# Patient Record
Sex: FEMALE | Race: BLACK OR AFRICAN AMERICAN | Employment: PART TIME | URBAN - METROPOLITAN AREA
[De-identification: names, ages, dates, MRNs, and addresses within clinical notes are randomized per-mention and may not be internally consistent; named-entity substitution may affect disease eponyms.]

---

## 2019-08-02 ENCOUNTER — HOSPITAL ENCOUNTER (EMERGENCY)
Age: 24
Discharge: HOME | End: 2019-08-03
Attending: EMERGENCY MEDICINE
Payer: SELF-PAY

## 2019-08-02 VITALS
OXYGEN SATURATION: 99 % | RESPIRATION RATE: 16 BRPM | BODY MASS INDEX: 48.82 KG/M2 | TEMPERATURE: 98.8 F | DIASTOLIC BLOOD PRESSURE: 72 MMHG | HEART RATE: 79 BPM | WEIGHT: 293 LBS | SYSTOLIC BLOOD PRESSURE: 116 MMHG | HEIGHT: 65 IN

## 2019-08-02 DIAGNOSIS — B96.89 BV (BACTERIAL VAGINOSIS): Primary | ICD-10-CM

## 2019-08-02 DIAGNOSIS — N76.0 BV (BACTERIAL VAGINOSIS): Primary | ICD-10-CM

## 2019-08-02 PROCEDURE — 99284 EMERGENCY DEPT VISIT MOD MDM: CPT

## 2019-08-02 PROCEDURE — 81003 URINALYSIS AUTO W/O SCOPE: CPT

## 2019-08-02 PROCEDURE — 87210 SMEAR WET MOUNT SALINE/INK: CPT

## 2019-08-02 PROCEDURE — 81025 URINE PREGNANCY TEST: CPT

## 2019-08-02 PROCEDURE — 87491 CHLMYD TRACH DNA AMP PROBE: CPT

## 2019-08-03 LAB
APPEARANCE UR: CLEAR
BILIRUB UR QL: NEGATIVE
COLOR UR: YELLOW
GLUCOSE UR STRIP.AUTO-MCNC: NEGATIVE MG/DL
HCG UR QL: NEGATIVE
HGB UR QL STRIP: NEGATIVE
KETONES UR QL STRIP.AUTO: NEGATIVE MG/DL
LEUKOCYTE ESTERASE UR QL STRIP.AUTO: NEGATIVE
NITRITE UR QL STRIP.AUTO: NEGATIVE
PH UR STRIP: 5.5 [PH] (ref 5–8)
PROT UR STRIP-MCNC: NEGATIVE MG/DL
SERVICE CMNT-IMP: NORMAL
SP GR UR REFRACTOMETRY: 1.02 (ref 1–1.03)
UROBILINOGEN UR QL STRIP.AUTO: 0.2 EU/DL (ref 0.2–1)
WET PREP GENITAL: NORMAL
WET PREP GENITAL: NORMAL

## 2019-08-03 PROCEDURE — 74011250637 HC RX REV CODE- 250/637: Performed by: EMERGENCY MEDICINE

## 2019-08-03 RX ORDER — METRONIDAZOLE 500 MG/1
500 TABLET ORAL 2 TIMES DAILY
Qty: 14 TAB | Refills: 0 | Status: SHIPPED | OUTPATIENT
Start: 2019-08-03 | End: 2019-08-10

## 2019-08-03 RX ORDER — METRONIDAZOLE 500 MG/1
500 TABLET ORAL
Status: COMPLETED | OUTPATIENT
Start: 2019-08-03 | End: 2019-08-03

## 2019-08-03 RX ADMIN — METRONIDAZOLE 500 MG: 500 TABLET, FILM COATED ORAL at 00:47

## 2019-08-03 NOTE — DISCHARGE INSTRUCTIONS
Bacterial Vaginosis: Care Instructions  Your Care Instructions    Bacterial vaginosis is a type of vaginal infection. It is caused by excess growth of certain bacteria that are normally found in the vagina. Symptoms can include itching, swelling, pain when you urinate or have sex, and a gray or yellow discharge with a \"fishy\" odor. It is not considered an infection that is spread through sexual contact. Although symptoms can be annoying and uncomfortable, bacterial vaginosis does not usually cause other health problems. However, if you have it while you are pregnant, it can cause complications. While the infection may go away on its own, most doctors use antibiotics to treat it. You may have been prescribed pills or vaginal cream. With treatment, bacterial vaginosis usually clears up in 5 to 7 days. Follow-up care is a key part of your treatment and safety. Be sure to make and go to all appointments, and call your doctor if you are having problems. It's also a good idea to know your test results and keep a list of the medicines you take. How can you care for yourself at home? · Take your antibiotics as directed. Do not stop taking them just because you feel better. You need to take the full course of antibiotics. · Do not eat or drink anything that contains alcohol if you are taking metronidazole (Flagyl). · Keep using your medicine if you start your period. Use pads instead of tampons while using a vaginal cream or suppository. Tampons can absorb the medicine. · Wear loose cotton clothing. Do not wear nylon and other materials that hold body heat and moisture close to the skin. · Do not scratch. Relieve itching with a cold pack or a cool bath. · Do not wash your vaginal area more than once a day. Use plain water or a mild, unscented soap. Do not douche. When should you call for help?   Watch closely for changes in your health, and be sure to contact your doctor if:    · You have unexpected vaginal bleeding.     · You have a fever.     · You have new or increased pain in your vagina or pelvis.     · You are not getting better after 1 week.     · Your symptoms return after you finish the course of your medicine. Where can you learn more? Go to http://matilda-erin.info/. Josué Urmila in the search box to learn more about \"Bacterial Vaginosis: Care Instructions. \"  Current as of: February 19, 2019  Content Version: 12.1  © 6602-4851 [employer]. Care instructions adapted under license by [employer] (which disclaims liability or warranty for this information). If you have questions about a medical condition or this instruction, always ask your healthcare professional. [address] any warranty or liability for your use of this information. Patient Education        Bacterial Vaginosis: Care Instructions  Your Care Instructions    Bacterial vaginosis is a type of vaginal infection. It is caused by excess growth of certain bacteria that are normally found in the vagina. Symptoms can include itching, swelling, pain when you urinate or have sex, and a gray or yellow discharge with a \"fishy\" odor. It is not considered an infection that is spread through sexual contact. Although symptoms can be annoying and uncomfortable, bacterial vaginosis does not usually cause other health problems. However, if you have it while you are pregnant, it can cause complications. While the infection may go away on its own, most doctors use antibiotics to treat it. You may have been prescribed pills or vaginal cream. With treatment, bacterial vaginosis usually clears up in 5 to 7 days. Follow-up care is a key part of your treatment and safety. Be sure to make and go to all appointments, and call your doctor if you are having problems. It's also a good idea to know your test results and keep a list of the medicines you take.   How can you care for yourself at home?  · Take your antibiotics as directed. Do not stop taking them just because you feel better. You need to take the full course of antibiotics. · Do not eat or drink anything that contains alcohol if you are taking metronidazole (Flagyl). · Keep using your medicine if you start your period. Use pads instead of tampons while using a vaginal cream or suppository. Tampons can absorb the medicine. · Wear loose cotton clothing. Do not wear nylon and other materials that hold body heat and moisture close to the skin. · Do not scratch. Relieve itching with a cold pack or a cool bath. · Do not wash your vaginal area more than once a day. Use plain water or a mild, unscented soap. Do not douche. When should you call for help? Watch closely for changes in your health, and be sure to contact your doctor if:    · You have unexpected vaginal bleeding.     · You have a fever.     · You have new or increased pain in your vagina or pelvis.     · You are not getting better after 1 week.     · Your symptoms return after you finish the course of your medicine. Where can you learn more? Go to http://matilda-erin.info/. Sameera Callahan in the search box to learn more about \"Bacterial Vaginosis: Care Instructions. \"  Current as of: February 19, 2019  Content Version: 12.1  © 0421-2604 [employer]. Care instructions adapted under license by [employer] (which disclaims liability or warranty for this information). If you have questions about a medical condition or this instruction, always ask your healthcare professional. [place] [address] any warranty or liability for your use of this information.
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
## 2019-08-05 LAB
C TRACH RRNA SPEC QL NAA+PROBE: NEGATIVE
N GONORRHOEA RRNA SPEC QL NAA+PROBE: NEGATIVE
SPECIMEN SOURCE: NORMAL

## 2020-01-31 ENCOUNTER — HOSPITAL ENCOUNTER (OUTPATIENT)
Dept: LAB | Age: 25
Discharge: HOME | End: 2020-01-31
Payer: MEDICAID

## 2020-01-31 PROCEDURE — 88175 CYTOPATH C/V AUTO FLUID REDO: CPT

## 2020-02-13 ENCOUNTER — OFFICE VISIT (OUTPATIENT)
Dept: SURGERY | Age: 25
End: 2020-02-13

## 2020-02-13 VITALS
DIASTOLIC BLOOD PRESSURE: 80 MMHG | BODY MASS INDEX: 47.09 KG/M2 | WEIGHT: 293 LBS | HEIGHT: 66 IN | TEMPERATURE: 98 F | SYSTOLIC BLOOD PRESSURE: 120 MMHG | OXYGEN SATURATION: 100 % | HEART RATE: 86 BPM | RESPIRATION RATE: 18 BRPM

## 2020-02-13 DIAGNOSIS — E66.01 MORBID OBESITY (HCC): Primary | ICD-10-CM

## 2020-02-13 NOTE — PROGRESS NOTES
Chief Complaint   Patient presents with    Obesity     confirmed video     Pt ID confirmed    Weight Loss Metrics 2/13/2020 2/13/2020 8/2/2019 1/11/2019 2/25/2018 7/30/2015 7/16/2015   Pre op / Initial Wt 307 - - - - - -   Today's Wt - 307 lb 300 lb 278 lb 256 lb 273 lb 263 lb   BMI - 49.55 kg/m2 49.92 kg/m2 44.87 kg/m2 41.32 kg/m2 44.08 kg/m2 42.47 kg/m2   Ideal Body Wt 137 - - - - - -   Excess Body Wt 170 - - - - - -   Goal Wt 171 - - - - - -   Wt loss to date 0 - - - - - -   % Wt Loss 0 - - - - - -   80%  - - - - - -       Body mass index is 49.55 kg/m².
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
## 2020-02-20 ENCOUNTER — HOSPITAL ENCOUNTER (OUTPATIENT)
Dept: BARIATRICS/WEIGHT MGMT | Age: 25
Discharge: HOME | End: 2020-02-20

## 2020-02-20 ENCOUNTER — DOCUMENTATION ONLY (OUTPATIENT)
Dept: BARIATRICS/WEIGHT MGMT | Age: 25
End: 2020-02-20

## 2020-02-20 NOTE — PROGRESS NOTES
[address] [facility] [place] [address] [facility], [address]    Patient's Name: Jackie Deleon   Age: 25 y. o. YOB: 1995   Sex: female    Date:   2/20/2020              Session: 1 of 6  Revision:     Surgeon:  Dr. Jessica Naidu    Height: 5 f 6   Weight:    307      Lbs. BMI:    Pounds Lost since last month: 0                 Pounds Gained since last month: 0    Starting Weight: 307     Previous Months Weight: 307  Overall Pounds Lost: 0   Overall Pounds Gained: 0    Do you smoke? None    Alcohol intake:  Number of drinks at a time:  None  Number of times a week: None    Class Guidelines    Guidelines are reviewed with patient at the start of every class. 1. Patient understands that weight loss trial classes must be consecutive. Patient understands if they miss a class, it is their responsibility to contact me to reschedule class. I will reach out to patient after their first no show. 2.  Patient understands the expectations that weight maintenance/weight loss is expected during the classes. Failure to demonstrate changes may result in one extra month of weight loss trial, followed by going back to see the surgeon. 3. Patient is also instructed to be doing their labs, blood work, psych visit, support group and any other test that the surgeon has used while they are working on their weight loss trial.   Patient understands that they CAN NOT gain any weight during the weight loss trial.  Gaining weight will result in extra classes    Other Pertinent Information:     Changes Made Since Last Class: No soda    Eating Habits and Behaviors      Today we started off class talking about the Key Diet Principles. Patient was encouraged to start drinking 64 ounces of fluid per day. Patient was encouraged to start cutting out soda, caffeine, carbonation, sweet tea, fruit juice, and fruit smoothies.   Patient is currently drinking 64 ounces of fluid, which consists of water. Patient was also instructed to fill up on meat, fish, vegetables, eggs, cheese, and some fruit. We also talked about protein drinks and patient was encouraged to start trying these, using them either for a meal replacement or a substitute for a current meal, which may be higher in carbohydrates. We talked about ways to lower carbohydrates and start trying substitutes, such as zucchini noodles and cauliflower rice. Patient's current diet habits include: Patient states she is not regularly eating 3 meals. We talked about getting in the habit of eating 3 meals per day. We talked about meal choices. She is eating fast food daily, which I have talked to her about. I have talked to her about planning ahead. Physical Activity/Exercise    Comments:     Currently for exercise, patient is not doing anything, but plans on starting. .  We talked about activities for patient to do, including walking, swimming, or chair exercises. I also talked with patient about doing some strength training, which helps the metabolism, as well. Goals have been set. Behavior Modification       Comments:   I also gave a power point on Behavior Changes and Weight Loss. Some of the suggestions in the power point included food journaling. Patient was also given some strategies to follow, such as cooking just enough for the meal and not putting serving bowls on the table. Patient was also encouraged to restrict where they are eating to 1-2 locations to avoid mindless eating throughout the day. Patient was given a check off list and encouraged to set 3 goals for the month. One goal that patient has set for next month is:  1. No snacks. 2. Exercise daily.       Lila Adkins [address]  2/20/2020

## 2020-03-17 ENCOUNTER — DOCUMENTATION ONLY (OUTPATIENT)
Dept: BARIATRICS/WEIGHT MGMT | Age: 25
End: 2020-03-17

## 2020-03-17 NOTE — PROGRESS NOTES
[address] [place] [facility] [address], [address]    Patient's Name: Bryant Manuel   Age: 25 y. o. YOB: 1995   Sex: female    Date:   3/17/2020    Insurance:            Session: 2 of 6  Revision:   Surgeon:  Dr. Shelley Barry    Height: 5 f 6 Weight:    307      Lbs. BMI:    Pounds Lost since last month: 0               Pounds Gained since last month: 0      Starting Weight: 307   Previous Months Weight: 307  Overall Pounds Lost: 0 Overall Pounds Gained: 0      Do you smoke? None    Alcohol intake:  Number of drinks at a time:  None  Number of times a week: None    Class Guidelines    Guidelines are reviewed with patient at the start of every class. 1. Patient understands that weight loss trial classes must be consecutive. Patient understands if they miss a class, it is their responsibility to contact me to reschedule class. I will reach out to patient after their first no show. 2.  Patient understands the expectations that weight maintenance/weight loss is expected during the classes. Failure to demonstrate changes may result in one extra month of weight loss trial, followed by going back to see the surgeon. Patient understands that they CAN NOT gain any weight during the weight loss trial.  Gaining weight will result in extra classes. 3. Patient is also instructed to be doing their labs, blood work, psych visit, support group and any other test that the surgeon has used while they are working on their weight loss trial.  4.  Patient was instructed to bring their blue binder to every class and appointment. Other Pertinent Information:     Changes Made Since Last Class: Stopped eating fried food. Drinking more water. Eating Habits and Behaviors    Today in class, we reviewed the key diet principles. I have talked to patient about pushing the fluid and working towards 64 ounces per day.     Patient was given ideas of liquids that would be okay. Patient was encouraged to cut out liquid calories, such as soda and sweet tea. We talked about the reasons that sugar sweetened beverages can promote weight gain. Sugar is highly palatable. Excessive consumption of sugar can trigger an exaggerated release of dopamine, which can promote a compulsive drive to consume more sugar sweetened beverages. Also, satiety is not reached with liquid calories the same way it does with solid calories. In class, we also talked about focusing on protein and low carbohydrates. Patient was encouraged during the weight loss trial to keep their carbohydrate less than 75 grams per day and their protein level at 60-80 grams per day. We talked about meal choices and snack ideas. Patient was given a packet on carbohydrate substitutions and recipe exchanges. This will allow them to still have some of the foods they enjoy, but a lower carbohydrate alternative. Patient's current diet habits include: Patient states she hasn't been skipping meals as much as before. Breakfast:  Bhargav Arturo and eggs. Lunch: sandwich. Thekedar Rand is a handful of lasagna with a salad. Patient states she may have string cheese or a handful of pepperoni. Patient is 55 ounces of water per day. Physical Activity/Exercise    Comments: We talked about exercise. Patient was given reasons of why exercise is so important and how that can help with their long-term success. I have encouraged patient to get a support system to help with the activity. Currently for activity, patient is doing a little bit of walking, planned on signing up for the gym, but states she hasn't. She is doing PT. Behavior Modification       Comments:  During today's lesson, I also spent some time talking about behavior changes. I talked to patient about the importance of taking vitamins post op and we reviewed the vitamins that patients will be taking post op.   Patient will hear this again at pre op class before surgery. Patient had the opportunity to ask questions about these vitamins that will be lifelong. Goals that patient wants to work on includes:  1. Focus on times to eat  2.        Lila Yeager [address]  3/17/2020

## 2020-03-18 ENCOUNTER — HOSPITAL ENCOUNTER (OUTPATIENT)
Dept: BARIATRICS/WEIGHT MGMT | Age: 25
Discharge: HOME | End: 2020-03-18

## 2020-04-22 ENCOUNTER — HOSPITAL ENCOUNTER (OUTPATIENT)
Dept: BARIATRICS/WEIGHT MGMT | Age: 25
Discharge: HOME | End: 2020-04-22

## 2020-04-22 ENCOUNTER — DOCUMENTATION ONLY (OUTPATIENT)
Dept: BARIATRICS/WEIGHT MGMT | Age: 25
End: 2020-04-22

## 2020-04-22 NOTE — PROGRESS NOTES
[address] [facility] [place] [address] [facility], [address]    Patient's Name: Shaylee Ceja   Age: 25 y. o. YOB: 1995   Sex: female    Date:   4/22/2020    Insurance:              Session: 3 of  6  Revision:     Surgeon:  Dr. Yee Chavez    Height: 5 f 6   Weight:    307      Lbs. BMI: 49.7   Pounds Lost since last month: 0                 Pounds Gained since last month: 0    Starting Weight: 307     Previous Months Weight: 307  Overall Pounds Lost: 0   Overall Pounds Gained: 0    Do you smoke? None    Alcohol intake:  Number of drinks at a time:  None  Number of times a week: None    Class Guidelines    Guidelines are reviewed with patient at the start of every class. 1. Patient understands that weight loss trial classes must be consecutive. Patient understands if they miss a class, it is their responsibility to contact me to reschedule class. I will reach out to patient after their first no show. 2.  Patient understands the expectations that weight maintenance/weight loss is expected during the classes. Failure to demonstrate changes may result in one extra month of weight loss trial, followed by going back to see the surgeon. Patient understands that they CAN NOT gain any weight during the weight loss trial.  Gaining weight will result in extra classes. 3. Patient is also instructed to be doing their labs, blood work, psych visit, support group and any other test that the surgeon has used while they are working on their weight loss trial.  4.  Patient was instructed to bring their blue binder to every class and appointment. Other Pertinent Information:     Changes Made Since Last Class: None    Eating Habits and Behaviors      We started off class today by reviewing key diet principles. Patient was given a very specific list of foods that they can eat, which included meat, fish, vegetables, eggs, cheese, fats, soy, and berries. Patient was also given a list of foods that should be avoided. These included sweets (candy, soda, baked goods, ice cream), and starches, including pasta, rice, crackers, chips, oatmeal, bread. We talked about appropriate protein-based snacks, including deli meat, low fat cheese, yogurt, hummus, small handful of almonds. I also gave a power point on ways to help with the metabolism. Some of the food-related ways included: Healthy snacking, eating adequate protein, and getting adequate water. Mild dehydration may slow down one's weight loss. Patient's current diet habits include: Patient states she has switched to multi-grain bread. She is drinking 80 ounces of water per day. Patient states she is eating 3 meals per day. Patient states she is not doing any snacks between meals. Patient states she is doing a boiled egg for breakfast.  She states she is eating more salad for lunch. Patient is drinking 80 ounces of fluid. Patient states she is something drinking soda, but is diluting it. Reminded patient this doesn't change the amount of sugar in it. Physical Activity/Exercise    Comments:     Currently for exercise, patient is walking at work, but nothing above and beyond her normal routine. We talked about activities for patient to do, including walking, swimming, or chair exercises. I also talked with patient about doing some strength training, which helps the metabolism, as well. Behavior Modification       Comments: In the power point, Mastering Your Metabolism, I also gave behaviors that can help with one's metabolism. These include not skipping meals and being sure to feed and fuel that body rather than going large gaps and putting the body in starvation mode. One goal for next month includes:  1. Eating more healthier.       Carina Mello, MS RD  4/22/2020

## 2020-05-05 ENCOUNTER — VIRTUAL VISIT (OUTPATIENT)
Dept: SURGERY | Age: 25
End: 2020-05-05

## 2020-05-05 VITALS — HEIGHT: 66 IN | WEIGHT: 293 LBS | BODY MASS INDEX: 47.09 KG/M2

## 2020-05-05 DIAGNOSIS — E66.01 MORBID OBESITY (HCC): Primary | ICD-10-CM
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
## 2020-05-05 NOTE — Clinical Note
JULIET  Midtrial  Patel/Christian  Worried she is going to gain weight, recommend follow up midtrail in early July   Labs: needs to be preformed/obtained  EKG: needs to be preformed/obtained   Nutritional evaluation: 3/6, to finish in July   Psychiatric evaluation: approved   Support Group: needs to attend   Additional evaluations: UGI: needs to be preformed/obtained

## 2020-05-05 NOTE — PATIENT INSTRUCTIONS
Central scheduling will call you to schedule your testing. Please allow 24-48 hours for them to contact you. IF you don't hear from them within that time frame please call them directly at #[phone].

## 2020-05-05 NOTE — PROGRESS NOTES
Chief Complaint   Patient presents with    Morbid Obesity     mid trial     Pt ID confirmed    Weight Loss Metrics 5/5/2020 5/5/2020 2/13/2020 2/13/2020 8/2/2019 1/11/2019 2/25/2018   Pre op / Initial Wt 307 - 307 - - - -   Today's Wt - 307 lb - 307 lb 300 lb 278 lb 256 lb   BMI - 49.55 kg/m2 - 49.55 kg/m2 49.92 kg/m2 44.87 kg/m2 41.32 kg/m2   Ideal Body Wt 137 - 137 - - - -   Excess Body Wt 170 - 170 - - - -   Goal Wt - - 171 - - - -   Wt loss to date 0 - 0 - - - -   % Wt Loss 0 - 0 - - - -   80%  - 136 - - - -       Body mass index is 49.55 kg/m².

## 2020-05-06 ENCOUNTER — HOSPITAL ENCOUNTER (OUTPATIENT)
Dept: GENERAL RADIOLOGY | Age: 25
Discharge: HOME | End: 2020-05-06
Attending: SURGERY
Payer: MEDICAID

## 2020-05-06 DIAGNOSIS — E66.01 MORBID OBESITY (HCC): ICD-10-CM

## 2020-05-06 PROCEDURE — 74240 X-RAY XM UPR GI TRC 1CNTRST: CPT

## 2020-05-06 PROCEDURE — 74011000250 HC RX REV CODE- 250: Performed by: SURGERY

## 2020-05-06 PROCEDURE — 74011000255 HC RX REV CODE- 255: Performed by: SURGERY

## 2020-05-06 RX ADMIN — BARIUM SULFATE 135 ML: 980 POWDER, FOR SUSPENSION ORAL at 09:15

## 2020-05-06 RX ADMIN — BARIUM SULFATE 176 G: 960 POWDER, FOR SUSPENSION ORAL at 09:16

## 2020-05-06 RX ADMIN — ANTACID/ANTIFLATULENT 4 G: 380; 550; 10; 10 GRANULE, EFFERVESCENT ORAL at 09:15

## 2020-05-13 ENCOUNTER — TELEPHONE (OUTPATIENT)
Dept: SURGERY | Age: 25
End: 2020-05-13

## 2020-05-13 NOTE — TELEPHONE ENCOUNTER
Pt called requesting the results of her UGI done on 5/6/2020. The nurse practitioner reviewed the results which were normal. Informed patient results were normal and did she have any other questions. Pt verbalized understanding and had no other questions at this time.

## 2020-05-20 ENCOUNTER — DOCUMENTATION ONLY (OUTPATIENT)
Dept: BARIATRICS/WEIGHT MGMT | Age: 25
End: 2020-05-20

## 2020-05-20 ENCOUNTER — HOSPITAL ENCOUNTER (OUTPATIENT)
Dept: BARIATRICS/WEIGHT MGMT | Age: 25
Discharge: HOME | End: 2020-05-20

## 2020-05-20 NOTE — PROGRESS NOTES
[address] [facility] [place] [address], [address]    Patient's Name: Leesa Fam   Age: 25 y. o. YOB: 1995   Sex: female    Date:   5/20/2020    Insurance:              Session: 4 of 6  Revision:     Surgeon:  Dr. Richard Toro    Height: 5 f 6   Weight:    307      Lbs. BMI: 49.7   Pounds Lost since last month: 0                 Pounds Gained since last month: 0    Starting Weight: 307     Previous Months Weight: 307  Overall Pounds Lost: 0   Overall Pounds Gained: 0    Do you smoke? None    Alcohol intake:  Number of drinks at a time:  None  Number of times a week: None    Class Guidelines    Guidelines are reviewed with patient at the start of every class. 1. Patient understands that weight loss trial classes must be consecutive. Patient understands if they miss a class, it is their responsibility to contact me to reschedule class. I will reach out to patient after their first no show. 2.  Patient understands the expectations that weight maintenance/weight loss is expected during the classes. Failure to demonstrate changes may result in one extra month of weight loss trial, followed by going back to see the surgeon. Patient understands that they CAN NOT gain any weight during the weight loss trial.  Gaining weight will result in extra classes. 3. Patient is also instructed to be doing their labs, blood work, psych visit, support group and any other test that the surgeon has used while they are working on their weight loss trial.  4.  Patient was instructed to bring their blue binder to every class and appointment. Other Pertinent Information:     Changes Made Since Last Class:     Eating Habits and Behaviors      Today in class we talked about the key diet principles.   We start off each class talking about these principles, which include cutting out liquid calories and focusing on water or other non-calorie, non-carbonated drinks. We also spent time talking about carbohydrates, including foods that have carbohydrates and the goal to keep daily carbohydrates under 100 grams per day. Patient was given ideas of meal and snack choices that are lower in carbohydrates and focus more on protein. Patient was encouraged to start trying protein shakes and was given a list of suggestions. The main topic of class today was: Portion Control. We reviewed in class a power point filled with tips on ways to control portions, including using smaller plates, boxing up portions at a restaurant before starting to eat, and not eating from the container, but rather portioning snacks into smaller bags. Patient's were encouraged to food journal, which helps increase awareness of what and how much they are eating. It was emphasized to patient the importance of reading labels and portion sizes, but also applying these portion sizes. Patient was given a list of items that can help to make portion control easier. For example, a deck of cards or a palm of a hand is a proper portion of meat, a fist is a cup or a proper serving of vegetables. Patient was given 10 tips to help with the portion control. Patient's current diet habits include: Patient states she wants to follow a low carbohydrate diet. I have given her some guidelines. Patient was going to encouraged to do a protein shake in the morning. For lunch, patient states she had 3-4 wheat crackers with some chicken salad. She states she may have a salad with balsamic vinegarette. She states some days some days she can eat and other days, she just doesn't want to eat. She states she may grab fruit to do a meal.  Patient states she is drinking 80 ounces of fluid per day. Patient states she doesn't like soda and hardly drinks sweet tea. Physical Activity/Exercise    Comments:     Currently for exercise, patient states she has been walking around the neighborhood.   She states she wants to look into an incline treadmill. Patient was given a list of ideas for activity and was encouraged to incorporate 30 minutes a day into their daily routine. Behavior Modification       Comments: In class, we also focused on the behavior aspects of weight management. This includes being a mindful eater and not eating in front of the TV. Patient is also encouraged to take 20 minutes to eat a meal and eat at a table. One goal for next month includes:  1. Follow the low carbohydrate diet prescribed.        Lila Banerjee Beau 87 RD  5/20/2020

## 2020-06-02 ENCOUNTER — TELEPHONE (OUTPATIENT)
Dept: SURGERY | Age: 25
End: 2020-06-02

## 2020-06-02 NOTE — TELEPHONE ENCOUNTER
Patient will go and get labs done in August she will feel more comfortable due to COVID-19 at this time.

## 2020-06-25 ENCOUNTER — HOSPITAL ENCOUNTER (OUTPATIENT)
Dept: BARIATRICS/WEIGHT MGMT | Age: 25
Discharge: HOME | End: 2020-06-25

## 2020-06-25 ENCOUNTER — DOCUMENTATION ONLY (OUTPATIENT)
Dept: BARIATRICS/WEIGHT MGMT | Age: 25
End: 2020-06-25

## 2020-06-25 NOTE — PROGRESS NOTES
6/25/20:  Patient did not show for her tele-visit nutrition class. Patient was left 2 voice mails on 2 separate occasions and was asked to call me to reschedule this appointment.     Royal Soria MS RD

## 2020-06-26 ENCOUNTER — DOCUMENTATION ONLY (OUTPATIENT)
Dept: BARIATRICS/WEIGHT MGMT | Age: 25
End: 2020-06-26

## 2020-06-26 NOTE — PROGRESS NOTES
[address] [facility] [place] [address], [address]    Patient's Name: Anil Maier   Age: 25 y. o. YOB: 1995   Sex: female    Date:   6/26/2020    Insurance:            Session: 5 of 6  Revision:   Surgeon:  Dr. Garduno Dear    Height: 5 f 6 Weight:    304      Lbs. BMI:    Pounds Lost since last month: 3               Pounds Gained since last month: 0    Starting Weight: 307   Previous Months Weight: 307  Overall Pounds Lost: 3 Overall Pounds Gained: 0      Do you smoke? None    Alcohol intake:  Number of drinks at a time:  None  Number of times a week: None    Class Guidelines    Guidelines are reviewed with patient at the start of every class. 1. Patient understands that weight loss trial classes must be consecutive. Patient understands if they miss a class, it is their responsibility to contact me to reschedule class. I will reach out to patient after their first no show. 2.  Patient understands the expectations that weight maintenance/weight loss is expected during the classes. Failure to demonstrate changes may result in one extra month of weight loss trial, followed by going back to see the surgeon. Patient understands that they CAN NOT gain any weight during the weight loss trial.  Gaining weight will result in extra classes. 3. Patient is also instructed to be doing their labs, blood work, psych visit, support group and any other test that the surgeon has used while they are working on their weight loss trial.  4.  Patient was instructed to bring their blue binder to every class and appointment. Other Pertinent Information:     Changes Made Since Last Class:     Eating Habits and Behaviors    Today in class, I reviewed a power point that discussed Nutrition, Behavior, and Exercise changes to start working on.   Some of the eating behaviors that we discussed included the importance of eating breakfast.  We talked about some of the reasons that people don't eat breakfast and food choices that would be appropriate. We also talked about avoiding liquid calories. Patient is encouraged to aim for 64 ounces of fluid per day and trying to get them from water, Crystal Light, sugar free drinks. We also talked about eating out options that are healthier. Patient was encouraged to always request sauces and dressings on the side and request a salad, broth-based soup, or vegetables in place of fries. Patient's current diet habits include: Patient states she has been eating 2 boiled eggs in the morning. She states she has started drinking a Premier protein shake. She states for lunch she is doing a small salad. She states she has not been doing any starches. She states she may have a piece of bread is moderation, but is otherwise avoiding starches. She is drinking 64 ounces of water per day. Physical Activity/Exercise    Comments: We talked about exercise. Patient was given reasons of why exercise is so important and how that can help with their long-term success. I have encouraged patient to get a support system to help with the activity. Currently for activity, patient was doing activity, but states she injured her foot. Behavior Modification       Comments: We also talked about behavior modifications. We talked about eating triggers, such as eating in front of the TV and solutions, such as making the TV a no eating zone. If patient is eating out out of emotion, food will only temporarily solve that. Patient is encouraged to HALT and assess if they are eating because they are Hungry, or out of emotions: Anxious, Lonely, Tired, which the food will only temporarily solve. We also talked about ways to prevent relapse. Goals that patient wants to work on includes:  1. Continue to follow protocol  2.        Lila Rogers 87 RD  6/26/2020

## 2020-07-20 ENCOUNTER — DOCUMENTATION ONLY (OUTPATIENT)
Dept: BARIATRICS/WEIGHT MGMT | Age: 25
End: 2020-07-20

## 2020-07-20 ENCOUNTER — HOSPITAL ENCOUNTER (OUTPATIENT)
Dept: BARIATRICS/WEIGHT MGMT | Age: 25
Discharge: HOME | End: 2020-07-20

## 2020-07-20 NOTE — PROGRESS NOTES
Nutrition Evaluation    Patient's Name: Dionciio Grimes   Age: 22 y.o. YOB: 1995   Sex: female    Height: 5 f 6 Weight: 303 BMI:    Starting Weight:  307        Smoking Status:  None  Alcohol Intake:  Number of Drinks at a Time: None  Number of Times a Week: None    Changes made during classes include:  - Eating more at now. No fast food  - Putting fork down between meals.  - Drinking more water        Two things that patient learned during this weight loss trial:  Nutrition is important  Stop before you feel full    Summary:  I feel that Dionicio Grimes has demonstrated appropriate diet changes and is ready to move forward with surgery. Patient has been briefed on the importance of the protein drinks, vitamins, and the transition of the diet stages. Patient understands that the long-term diet will focus on protein and vegetables. Patient understand the effects of carbohydrates after surgery and what reactive hypoglycemia is. Patient is aware that they will be attending pre-op class 2 weeks before surgery and will get more detailed information on the post-op diet guidelines. Patient will see me again at 6 weeks post-op. At this 6 week visit, RD will assess how patient is tolerating soft protein and advance to vegetables, if tolerating soft protein without difficulty. Patient will also see RD again at 9 months post-op. This visit will assess patient's compliance with current protocol, including diet, vitamins, protein shakes, and exercise. Post-op diet guidelines will be reinforced. RD is available for questions and to meet with patient outside of the 6 week and 9 month post-op visit. We spent a lot of time talking about the vitamins. Patient understands the importance of being compliant with the diet protocol and the complications and risks that can occur if they are non-compliant with the nutritional protocol. Patient has attended at least one support group.     Candidate for surgery: Yes  Re-evaluation Date:     Procedure:  Gastric Sleeve     Peyton Garnica, MS RD  7/20/2020

## 2020-07-20 NOTE — PROGRESS NOTES
[address] [place] [address], [address]    Patient's Name: Edilson Vera   Age: 22 y.oLorraine YOB: 1995   Sex: female    Date:   7/20/2020    Insurance:              Session: 6 of 6  Revision:     Surgeon:  Dr. Elan Mcmahon    Height: 5 f 5   Weight:    303      Lbs. BMI:    Pounds Lost since last month: 1                 Pounds Gained since last month: 0    Starting Weight: 307     Previous Months Weight: 304  Overall Pounds Lost: 4   Overall Pounds Gained: 0      Do you smoke? None    Alcohol intake:  Number of drinks at a time:  None  Number of times a week: None    Class Guidelines    Guidelines are reviewed with patient at the start of every class. 1. Patient understands that weight loss trial classes must be consecutive. Patient understands if they miss a class, it is their responsibility to contact me to reschedule class. I will reach out to patient after their first no show. 2.  Patient understands the expectations that weight maintenance/weight loss is expected during the classes. Failure to demonstrate changes may result in one extra month of weight loss trial, followed by going back to see the surgeon. Patient understands that they CAN NOT gain any weight during the weight loss trial.  Gaining weight will result in extra classes. 3. Patient is also instructed to be doing their labs, blood work, psych visit, support group and any other test that the surgeon has used while they are working on their weight loss trial.  4.  Patient was instructed to bring their blue binder to every class and appointment. Other Pertinent Information:     Changes Made Since Last Class: Patient states she hasn't been eating much bread. Eating Habits and Behaviors    Today in class, we started talking about the key diet principles. We first focused on stopping liquid calories. Patient was also educated on carbohydrates. Patient was instructed to start cutting out bread, rice, and pasta from the diet and start focusing more on meat and vegetables. I then gave a power point, which focused on Label Reading. In class, I gave patients a labels and we worked through a series of questions to help patients have a better understanding of label reading. Patient was instructed to review the serving size. Patient was encouraged to focus on protein and carbohydrates. We also did a few label reading activities to help the patient become more familiar with label reading. Patient's current diet habits include: Patient states she is eating 3 meals per day. She states she is eating turkey coronel for breakfast. Lunch has been some type of protein. She states she has been more mindful and not eating the rice. She states she may snack on some Cuties between meals if she gets hungry. She states she has an 80 ounce water bottle and will sip on that. Physical Activity/Exercise    Comments: We talked about exercise. Patient was given reasons of why exercise is so important and how that can help with their long-term success. I have encouraged patient to get a support system to help with the activity. Currently for activity, patient is doing curls and crunches in the morning. She states she is trying to do some marching in place. Behavior Modification       Comments:  Behavior modifications were reinforced. This included not eating in front of the TV, which could lead to bigger portions and eating when one is not hungry. We also talked about the importance of eating 3 meals per day. Patient was encouraged to food journal to keep their daily carbohydrates less than 30 grams per meal.      Goals that patient wants to work on includes:  1. She states she wants to do some strength training  2.        Lila Childress Beau 87 RD  7/20/2020

## 2020-11-12 ENCOUNTER — TELEPHONE (OUTPATIENT)
Dept: SURGERY | Age: 25
End: 2020-11-12

## 2020-11-16 ENCOUNTER — DOCUMENTATION ONLY (OUTPATIENT)
Dept: SURGERY | Age: 25
End: 2020-11-16

## 2020-11-16 DIAGNOSIS — E66.01 MORBID OBESITY (HCC): Primary | ICD-10-CM

## 2020-11-16 NOTE — PROGRESS NOTES
Spoke with pt regarding last remaining requirements. Pt is still missing labs and EKG. Pt stated she has some concerns as far as Covid rate going back up and would like to wait. Advised pt I will reach out to her in December to see how she is feeling. Pt stated that she would contact us if she gets labs and EKG done sooner. María Reid, can you please put in a new order for an EKG? Her original order has been discontinued. Thank you.

## 2021-04-26 ENCOUNTER — OFFICE VISIT (OUTPATIENT)
Dept: SURGERY | Age: 26
End: 2021-04-26
Payer: MEDICAID

## 2021-04-26 VITALS
WEIGHT: 293 LBS | HEIGHT: 66 IN | SYSTOLIC BLOOD PRESSURE: 110 MMHG | HEART RATE: 100 BPM | BODY MASS INDEX: 47.09 KG/M2 | DIASTOLIC BLOOD PRESSURE: 74 MMHG | OXYGEN SATURATION: 97 % | TEMPERATURE: 98.1 F | RESPIRATION RATE: 18 BRPM

## 2021-04-26 DIAGNOSIS — Z01.818 PRE-OP TESTING: ICD-10-CM

## 2021-04-26 DIAGNOSIS — E66.01 MORBID OBESITY (HCC): Primary | ICD-10-CM

## 2021-04-26 PROCEDURE — 99213 OFFICE O/P EST LOW 20 MIN: CPT | Performed by: NURSE PRACTITIONER

## 2021-04-26 RX ORDER — MELATONIN
1000 DAILY
COMMUNITY
Start: 2020-11-12

## 2021-04-26 RX ORDER — MULTIVIT,CALC,MINS/IRON/FOLIC 500-18-0.4
1 TABLET ORAL DAILY
COMMUNITY
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
## 2021-04-26 NOTE — PROGRESS NOTES
Hellen Livingston is a 22 y.o. female  Chief Complaint   Patient presents with    Morbid Obesity     discuss re-enrollment in bariatric program     Pt ID confirmed    Weight Loss Metrics 4/26/2021 4/26/2021 3/25/2021 5/5/2020 5/5/2020 2/13/2020 2/13/2020   Pre op / Initial Wt 321.6 - - 307 - 307 -   Today's Wt - 321 lb 9.6 oz 313 lb - 307 lb - 307 lb   BMI - 51.91 kg/m2 50.52 kg/m2 - 49.55 kg/m2 - 49.55 kg/m2   Ideal Body Wt 137 - - 137 - 137 -   Excess Body Wt 184.6 - - 170 - 170 -   Goal Wt 174 - - - - 171 -   Wt loss to date 0 - - 0 - 0 -   % Wt Loss 0 - - 0 - 0 -   80% .68 - - 136 - 136 -       Body mass index is 51.91 kg/m².

## 2021-04-26 NOTE — PATIENT INSTRUCTIONS
Orders Placed This Encounter    ALBUMIN     Standing Status:   Future     Standing Expiration Date:   4/27/2022    CBC WITH AUTOMATED DIFF     Standing Status:   Future     Standing Expiration Date:   4/27/2022    FERRITIN     Standing Status:   Future     Standing Expiration Date:   4/27/2022    H PYLORI AB, IGG, QT     Standing Status:   Future     Standing Expiration Date:   5/26/2021    IRON     Standing Status:   Future     Standing Expiration Date:   4/27/2022    LIPID PANEL     Standing Status:   Future     Standing Expiration Date:   2/51/9887    METABOLIC PANEL, BASIC     Standing Status:   Future     Standing Expiration Date:   4/27/2022    TSH 3RD GENERATION     Standing Status:   Future     Standing Expiration Date:   4/27/2022    VITAMIN B1, WHOLE BLOOD     Standing Status:   Future     Standing Expiration Date:   4/27/2022    VITAMIN B12 & FOLATE     Standing Status:   Future     Standing Expiration Date:   4/27/2022    VITAMIN D, 25 HYDROXY     Standing Status:   Future     Standing Expiration Date:   4/27/2022    NICOTINE AND METABOLITE, QT SERUM, PLASMA, WHOLE BLOOD     Standing Status:   Future     Standing Expiration Date:   7/26/2021    EKG, 12 LEAD, INITIAL     Standing Status:   Future     Standing Expiration Date:   10/26/2021     Order Specific Question:   Reason for Exam:     Answer:   eval pre op     To do:   Labs   Nicotine test   EKG   Psych -- call to see if sooner appt   Get 307 or below   Follow up 1 month Seymour

## 2021-06-14 ENCOUNTER — OFFICE VISIT (OUTPATIENT)
Dept: SURGERY | Age: 26
End: 2021-06-14
Payer: MEDICAID

## 2021-06-14 VITALS
HEART RATE: 111 BPM | RESPIRATION RATE: 20 BRPM | WEIGHT: 293 LBS | DIASTOLIC BLOOD PRESSURE: 80 MMHG | TEMPERATURE: 97.9 F | SYSTOLIC BLOOD PRESSURE: 117 MMHG | HEIGHT: 66 IN | BODY MASS INDEX: 47.09 KG/M2

## 2021-06-14 DIAGNOSIS — Z01.818 PRE-OP TESTING: ICD-10-CM

## 2021-06-14 DIAGNOSIS — E66.01 MORBID OBESITY (HCC): Primary | ICD-10-CM

## 2021-06-14 PROCEDURE — 99212 OFFICE O/P EST SF 10 MIN: CPT | Performed by: NURSE PRACTITIONER

## 2021-06-14 NOTE — PATIENT INSTRUCTIONS
To do:   Labs   Nicotine test   EKG   Chest xray   Get 307lbs or below     Call for appt with Dr. Luis Carlos Hamilton  [facility]  [address] [place], [place]  Phone: [phone]  Fax: [phone]  Offering laboratory services & EKG   Monday  Friday 7:00 a.m. to 3:30 p.m. [facility] at [address]  Tel: [phone]  Outpatient Laboratory/Phlebotomy  Phone: [phone]  Hours of operation:  Monday - Friday 7:00 a.m.  5:00 p.m. Saturday 7:00 a.m. to noon. [facility]     Your physician has referred you for the procedure listed below. Your Care Coordinator will contact you within 24 hours to schedule.   No prior scheduling is required for X-rays.      Office/Provider Information    Account Name:  [facility]                          Address:  [address] [place] [address]  Phone: [phone]  Fax: [phone] Provider:           Kal Wolfe NP     Provider NPI:   [id number]          Patient Information:   Name: Iglesia Kong   Gender: Female   YOB: 1995   Age: 22 years   Address: [place], [place] Zip: [facility]    SSN: [id number]   Patient ID: [id number]   Phone: [phone]       Alt Control#:     Alt Patient ID: [id number]            CPT TESTS ORDERED (Total: 1) PRIORITY      88849 XR CHEST PA LAT Routine Routine             Reason for Exam:     Additional Information:   Is Patient Pregnant: Unknown                   Comments:         Diagnosis Codes:   E66.01 Z68.42 R12.747             Bill Type:           Responsible Party / Guarantor Information:   Name: Iglesia Kong   Address: [place], [place] Zip: [facility]   Phone: [phone]   Relation to Pt: Self   Employer Name: NOT EMPLOYED         ABN:      Worker's Comp: N Date of Injury:              Insurance Information:   Primary Insurance: Secondary Insurance:   Ins Co Name: [address] [place] [address] [address]   Address 1: [facility] [address]   Address 2[facility]   Policy Number: [id number]   Group #: [id number]    Ins Co Name:     Address 1:     Address 2:     [place], [place] Zip:     Policy Number:     Group #:        Primary Policy Arnold / Insured: Marshal Arango / Allentrini Poster:   Name: Rosemary Amaro   Address: [address] [place] [address]   Pt Relation to Subscriber: Self    Name:     Address:          Pt Relation to Subscriber:           Order Providers     Name Phone   Ordering Provider Akbar Brown [phone]   Authorizing Provider Akbar Brown [phone]   Order Date: 6/14/21 Order Time: 9:27 AM Expected Date: 6/14/21      Signature (For External Use):_____________________________   Electronically Signed by: Leonela Cervantes, [address]  Page 1 of 1   Client Code:  [id number]         Req/Control #: [id number]       Collection Date:   Bill Insurance Unless Marked - [id number]       Collection Time:   Client Bill [ ] - Delfina Mohs By:    Self Pay [ ] - Mason              Client / Ordering Site Information: Physician Information:   Location: [facility] ([id number])   Address: [address] [place] [address] [facility] [address]   Phone (Fax): [phone] ([phone])    Ordering: Akbar Brown   Title: HUBER   NPI: [id number]   UPIN: Not on file   Physician ID:           Patient Information:   Name: Rosemary Amaro   Gender: Female   YOB: 1995   Age: 22 years   Address: [place], [place] Zip: [facility]    SSN: [id number]   Patient ID: [id number]   Phone: [phone]       Alt Control#: [id number]   Alt Patient ID:              CPT CODE TESTS ORDERED (Total: 11) PRIORITY ORDERED EXPECTED EXPIRES TUBE CAP     [ ] BCF - Add Blood Collection Fee        06472 6684 ALBUMIN Routine 4/26/2021 4/26/2021 4/27/2022 Not on File   83807 1001 CBC WITH AUTOMATED DIFF Routine 4/26/2021 4/26/2021 4/27/2022 Not on File   10162 7130 FERRITIN Routine 4/26/2021 4/26/2021 4/27/2022 Not on File   97108 6875 IRON Routine 4/26/2021 4/26/2021 4/27/2022 Not on File   62947 96795 LIPID PANEL Routine 4/26/2021 4/26/2021 4/27/2022 Not on File   98649 0922 METABOLIC PANEL, BASIC Routine 4/26/2021 4/26/2021 4/27/2022 Not on File   77758 7250 TSH 3RD GENERATION Routine 4/26/2021 4/26/2021 4/27/2022 Not on File   56530 6681 VITAMIN B1, WHOLE BLOOD Routine 4/26/2021 4/26/2021 4/27/2022 Not on File   30286 45262 VITAMIN B12 & FOLATE Routine 4/26/2021 4/26/2021 4/27/2022 Not on File   65884 8713 VITAMIN D, 25 HYDROXY Routine 4/26/2021 4/26/2021 4/27/2022 Not on File   [id number] NICOTINE AND METABOLITE, QT SERUM, PLASMA, WHOLE BLOOD Routine 4/26/2021 4/26/2021 7/26/2021 Not on File         Comments:         Specimen Source:   ([id number]) VITAMIN B1, WHOLE BLOOD:   Blood    ([id number]) NICOTINE AND METABOLITE, QT SERUM, PLASMA, WHOLE BLOOD:   Blood            Diagnosis Codes:   E66.01 Z68.42               Bill Type:      Ins Code: Not on file         Responsible Party / Guarantor Information:   Name: Evelio Scarlett   Address: [facility] Zip: [facility]   Phone: [phone]   Relation to Pt: Self   Employer Name: NOT EMPLOYED         ABN:      Worker's Comp: N Date of Injury:              Insurance Information:   Primary Insurance: Secondary Insurance:   Ins Co Name: [address] [place] [address] [address]   Address 1: [facility] [address]   Address 2: [facility], [address] [facility]   Policy Number: [id number]   Group #: OFC    Ins Co Name:     Address 1:     Address 2:     [place], [place] Zip:     Policy Number:     Group #:        Primary Policy Arnold / Insured: Secondary Policy Alonzo Girt / Insured:   Name: Evelio Pantoja   Address: [address] [place] [address]   Pt Relation to Subscriber: Self    Name:     Address:          Pt Relation to Subscriber:           Electronically Signed By:   [id number]  Lucero   [id number]        FATUMA MESA   1995 [id number]    FATUMA MESA   1995 [id number]    FATUMA MESA   1995 [id number]    [facility]   1995 [id number]      [facility]   1995 [id number]    MESA,TRENORA   1995 [id number]    [facility]   1995 [id number]    [facility]   1995 [id number]                                                [address]                              Phone:                                                                      Fax:  ________________________________________________________________________________    Patient Name: FATUMA MESA([id number])  Sex: Female  : 1995      [address] [place] [address]    [phone]    Primary Coverage                Secondary Coverage                Payor: [employer]          No secondary coverage found.       Plan: HOLLIE LEDESMA [employer] FAMILY [place]*                                  Subscriber Information                                            ID: [id number]                                                     Name: Yohana Huerta                                            SSN: [id number]                                                  Address: [address] [place] [address] No: OFC                                                     ABN Status:  ________________________________________________________________________________    [facility] Physician ID: Laurel Chen Ordering Physician: <No UPIN>  Kelsi Anders    Order Specific Information  Order: EKG, 12 LEAD, INITIAL [CPT(R): 54612]  Order #: [id number]: 1 FUTURE    Priority: Routine  Class:  Ancillary Performed    Future Order Information      Expires on:10/26/2021            Expected by:04/26/2021                   Associated Diagnoses      E66.01 [facility]bid obesity ([facility]      Z68.42 BMI 45.0-49.9, adult ([facility]      Reason for Exam: -> eval pre op                     Start Date:04/26/21    Process Information:   Electronically Signed By:    Ordering Physician: <No UPIN>  Kelsi Anders    Printed: 6/14/21   9:28 AM
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
## 2021-06-22 ENCOUNTER — DOCUMENTATION ONLY (OUTPATIENT)
Dept: BARIATRICS/WEIGHT MGMT | Age: 26
End: 2021-06-22

## 2021-06-22 NOTE — PROGRESS NOTES
6/22/2021:  Patient completed her 6 month weight loss trial with me in July 2020. She did not end up having surgery. She recently saw Blane Ortiz and was 13 pounds over her starting weight. Since she was approaching the 1 year deadline for her weight loss trial, patient called me and stated she wanted to start over her 6 month weight loss trial.  I have scheduled her for her 1st class on July 7. Patient understands the expectations.      Beacher Libman, MS RD

## 2021-07-07 ENCOUNTER — HOSPITAL ENCOUNTER (OUTPATIENT)
Dept: BARIATRICS/WEIGHT MGMT | Age: 26
Discharge: HOME | End: 2021-07-07

## 2021-07-07 ENCOUNTER — DOCUMENTATION ONLY (OUTPATIENT)
Dept: BARIATRICS/WEIGHT MGMT | Age: 26
End: 2021-07-07

## 2021-07-07 NOTE — PROGRESS NOTES
[address] [facility] [address], [address]    Patient's Name: Elli Ross   Age: 32 y.o. YOB: 1995   Sex: female    Date:   7/7/2021    Insurance:              Session: 1 of 6  Revision:     Surgeon:  Dr. Maile Carrion    Height: 5 f 6   Weight:    327      Lbs. BMI: 67.3   Pounds Lost since last month: 0                 Pounds Gained since last month: 7    Starting Weight: 320     Previous Months Weight: 320  Overall Pounds Lost: 0   Overall Pounds Gained: 7      Do you smoke? None    Alcohol intake:  Number of drinks at a time:  None  Number of times a week: None    Class Guidelines    Guidelines are reviewed with patient at the start of every class. 1. Patient understands that weight loss trial classes must be consecutive. Patient understands if they miss a class, it is their responsibility to contact me to reschedule class. I will reach out to patient after their first no show. 2.  Patient understands the expectations that weight maintenance/weight loss is expected during the classes. Failure to demonstrate changes may result in one extra month of weight loss trial, followed by going back to see the surgeon. Patient understands that they CAN NOT gain any weight during the weight loss trial.  Gaining weight will result in extra classes. 3. Patient is also instructed to be doing their labs, blood work, psych visit, support group and any other test that the surgeon has used while they are working on their weight loss trial.  4.  Patient was instructed to bring their blue binder to every class and appointment. Other Pertinent Information:     Changes Made Since Last Class: Tried to add breakfast bowls or just a protein drink the morning. Eating Habits and Behaviors    Today in class, we started talking about the key diet principles. We first focused on stopping liquid calories.   Patient was also educated on carbohydrates. Patient was instructed to start cutting out bread, rice, and pasta from the diet and start focusing more on meat and vegetables. I then gave a power point, which focused on Label Reading. In class, I gave patients a labels and we worked through a series of questions to help patients have a better understanding of label reading. Patient was instructed to review the serving size. Patient was encouraged to focus on protein and carbohydrates. We also did a few label reading activities to help the patient become more familiar with label reading. Patient's current diet habits include: 3 meals per day. Patient is doing a protein shake, breakfast bowl, or boiled eggs with coronel. Lunch is hamburger with cheese. Dinner is green beans or chicken wings. She states she is not snacking in between meals. She states she is eating out 1 x a week, but is not eating any bread. She is drinking 3 bottles of water per day, no liquid calories. Physical Activity/Exercise    Comments: We talked about exercise. Patient was given reasons of why exercise is so important and how that can help with their long-term success. I have encouraged patient to get a support system to help with the activity. Currently for activity, patient is doing a walk for 10 minutes per day. Behavior Modification       Comments:  Behavior modifications were reinforced. This included not eating in front of the TV, which could lead to bigger portions and eating when one is not hungry. We also talked about the importance of eating 3 meals per day. Patient was encouraged to food journal to keep their daily carbohydrates less than 30 grams per meal.      Goals that patient wants to work on includes:  1. Improve her distance of walking.   [address] [place], [address]  7/7/2021

## 2021-07-08 DIAGNOSIS — Z01.818 PRE-OP TESTING: ICD-10-CM

## 2021-07-08 DIAGNOSIS — E66.01 MORBID OBESITY (HCC): Primary | ICD-10-CM

## 2021-08-11 ENCOUNTER — DOCUMENTATION ONLY (OUTPATIENT)
Dept: BARIATRICS/WEIGHT MGMT | Age: 26
End: 2021-08-11

## 2021-08-11 ENCOUNTER — HOSPITAL ENCOUNTER (OUTPATIENT)
Dept: BARIATRICS/WEIGHT MGMT | Age: 26
Discharge: HOME | End: 2021-08-11

## 2021-08-11 NOTE — PROGRESS NOTES
[address] [facility] [place] [address] [facility], [address]    Patient's Name: Sammie Toth   Age: 32 y.o. YOB: 1995   Sex: female    Date:   8/4/2021   Insurance:            Session: 2 of 6  Revision:   Surgeon:  Dr. Chirag Robins    Height: 5 f 6 Weight:    318      Lbs. BMI: 51.4   Pounds Lost since last month: 9               Pounds Gained since last month: 0      Starting Weight: 320   Previous Months Weight: 327  Overall Pounds Lost: 2 Overall Pounds Gained: 0      Do you smoke? None    Alcohol intake:  Number of drinks at a time:  None  Number of times a week: None    Class Guidelines    Guidelines are reviewed with patient at the start of every class. 1. Patient understands that weight loss trial classes must be consecutive. Patient understands if they miss a class, it is their responsibility to contact me to reschedule class. I will reach out to patient after their first no show. 2.  Patient understands the expectations that weight maintenance/weight loss is expected during the classes. Failure to demonstrate changes may result in one extra month of weight loss trial, followed by going back to see the surgeon. Patient understands that they CAN NOT gain any weight during the weight loss trial.  Gaining weight will result in extra classes. 3. Patient is also instructed to be doing their labs, blood work, psych visit, support group and any other test that the surgeon has used while they are working on their weight loss trial.  4.  Patient was instructed to bring their blue binder to every class and appointment. Other Pertinent Information:     Changes Made Since Last Class: Stopped snacking between meals. Eating Habits and Behaviors    Today in class, we reviewed the key diet principles. I have talked to patient about pushing the fluid and working towards 64 ounces per day. We focused on following a low-calorie diet. Patient was instructed to count their carbohydrates and try to keep their daily intake under 75 grams per day and try to keep their daily protein at 80 grams per day. Patient was given examples of carbohydrates in starches. Patient was encouraged to focus on meat and vegetables and begin cutting carbohydrates out. We talked about foods that are protein-based and how to incorporate those into their meals. I also reviewed with patient the importance of eating 3 meals per day and suggestions were made for breakfast items. Patient's current diet habits include: 3 meals per day. She is doing 3 coronel slices and 2 eggs for breakfast.  LUnch is turkey salad. Dinner is baked chicken, spinach, and zucchini. She states she is eating from a standard size plate. She states she has stopped a lot of her carbohydrates. She is drinking 32 ounces of water per day. Physical Activity/Exercise    Comments: We talked about exercise. Patient was given reasons of why exercise is so important and how that can help with their long-term success. I have encouraged patient to get a support system to help with the activity. Currently for activity, patient is walking on the treadmill for 10 minutes per day and is trying to increase this. Behavior Modification       Comments:  During today's lesson, I gave a presentation called The 100-200 Calorie \"Mindless Margin. \"  The goal is to make modest daily 100-200 calorie reductions in certain things that the body won't notice. One, 100-200 calorie change and would will look 10-20 pounds in one year. An example could be cutting soda. Patient was given a check off list and was encouraged to come up with 1-3 100 calories changes they could make. The check off list is a daily tracker to see if these goals are being met. Goals that patient wants to work on includes:  1. Continue to work on increasing her activity level.   [address], MS RD  8/4/2021

## 2021-09-01 ENCOUNTER — HOSPITAL ENCOUNTER (OUTPATIENT)
Dept: BARIATRICS/WEIGHT MGMT | Age: 26
Discharge: HOME | End: 2021-09-01

## 2021-09-01 ENCOUNTER — DOCUMENTATION ONLY (OUTPATIENT)
Dept: BARIATRICS/WEIGHT MGMT | Age: 26
End: 2021-09-01

## 2021-09-01 NOTE — PROGRESS NOTES
[address] [facility] [place] [address] [facility], [address]    Patient's Name: Chema Ortiz   Age: 32 y.o. YOB: 1995   Sex: female    Date:   9/1/2021    Insurance:              Session: 3 of 6  Surgeon:  Dr. Suzette Mcgee    Height: 5 f 6   Weight:    318      Lbs. BMI:    Pounds Lost since last month: 0                Pounds Gained since last month: 0    Starting Weight: 320     Previous Months Weight: 318  Overall Pounds Lost: 2   Overall Pounds Gained: 0    Smoking:  None    Alcohol intake:  Number of drinks at a time:  None  Number of times a week: None    Class Guidelines    Guidelines are reviewed with patient at the start of every class. 1. Patient understands that weight loss trial classes must be consecutive. Patient understands if they miss a class, it is their responsibility to contact me to reschedule class. I will reach out to patient after their first no show. 2.  Patient understands the expectations that weight maintenance/weight loss is expected during the classes. Failure to demonstrate changes may result in one extra month of weight loss trial, followed by going back to see the surgeon. 3. Patient is also instructed to be doing their labs, blood work, psych visit, support group and any other test that the surgeon has used while they are working on their weight loss trial.    Other Pertinent Information:     Changes Made Since Last Class: None    Eating Habits and Behaviors      During today's class, we continued to focus on the key diet principles. Patient was instructed to follow a low carbohydrate diet, focusing on meat and vegetables. Patient was instructed to stop liquid calories and aim for 64 ounces of water per day.  We focused on focusing in on bigger problem areas to start making changes to, such as reducing fast food intake, reducing carbonated beverages/soda intake, decreasing carbohydrates intake daily, etc. We reviewed protein shakes and high protein yogurts to chose, as well. During today's class, we also talked about how to read a label. Patient was given information on:  1. The benefits of reading a label, which allowed one to compare the nutritional value of similar products and make healthy food decisions. 2. The ingredient list, which can help to determine if the food is heathy or something that fits into the diet. 3. The importance of reading the serving size and making sure to apply that to the portion size that they are consuming. Patient was also educated on carbohydrates. I talked to patient about:  1. The function of carbohydrates. 2. Foods that carbohydrate-heavy. 3. Patient was given the guidelines to keep their carbohydrates less than 75 grams per day in the pre-op phase. 4. Patient was also given ideas of low carb swaps, which include zucchini noodles, spaghetti squash, or cauliflower rice. 5. Lower carbohydrate fruit options were discussed. 6. Discussed lower carb swaps to use instead of potato chips. Patient's dietary habits include: Patient is eating 2 meal per day. Meals are made up of protein shakes, chicken, asparagus,. Portions are:  Medium sized. Patient is eating out: 1 x a week. Patient is snacking on 0. I have talked to patient about some lower carbohydrate snack choices that focused more protein. Patient is drinking 32 ounces of fluid per day. Fluid intake is make up of: water only. Physical Activity/Exercise     Comments:     Currently for exercise, patient walking for 20-30 minutes per day. I have talked to patient about some suggestions to start an exercise routine. Patient is encouraged to purchase a pedometer and use this to track her steps. I have made some suggestions to patient of ways to incorporate exercise in with a busy lifestyle. We also talked about You Tube videos that can be used for an exercise routine.          Behavior Modification  Comments:  Behavior modifications were discussed with the patient. Some of those behavior modifications include:  1. Emphasized the importance of eating slowly, not eating and drinking meals at the same time. 2.  Taking 20-30 minutes to eat a meal  3. I have encouraged patient to follow journal, which may be done by paper or tracking it an isaiah, such as My Fitness Pal or Isai. #5 [place] Adrien Gonzalesta Final. Patient understands the importance of following through with these behaviors following surgery to aid in long term weight loss. Tips and advice were given on how to start implementing these into the patient's life. Patient has attended the required bariatric support group. Goal patient has set for next month:  1.  Try to get 3 meals per day in.  2.      Stalin Valerio, MS RD  9/1/2021

## 2021-09-28 ENCOUNTER — HOSPITAL ENCOUNTER (OUTPATIENT)
Dept: GENERAL RADIOLOGY | Age: 26
Discharge: HOME | End: 2021-09-28
Payer: MEDICAID

## 2021-09-28 ENCOUNTER — HOSPITAL ENCOUNTER (OUTPATIENT)
Dept: LAB | Age: 26
Discharge: HOME | End: 2021-09-28
Payer: MEDICAID

## 2021-09-28 DIAGNOSIS — Z01.818 PRE-OP TESTING: ICD-10-CM

## 2021-09-28 DIAGNOSIS — E66.01 MORBID OBESITY (HCC): ICD-10-CM

## 2021-09-28 LAB
ATRIAL RATE: 94 BPM
CALCULATED P AXIS, ECG09: 45 DEGREES
CALCULATED R AXIS, ECG10: 21 DEGREES
CALCULATED T AXIS, ECG11: 5 DEGREES
DIAGNOSIS, 93000: NORMAL
P-R INTERVAL, ECG05: 148 MS
Q-T INTERVAL, ECG07: 338 MS
QRS DURATION, ECG06: 74 MS
QTC CALCULATION (BEZET), ECG08: 422 MS
SENTARA SPECIMEN COL,SENBCF: NORMAL
VENTRICULAR RATE, ECG03: 94 BPM

## 2021-09-28 PROCEDURE — 93005 ELECTROCARDIOGRAM TRACING: CPT

## 2021-09-28 PROCEDURE — 99001 SPECIMEN HANDLING PT-LAB: CPT

## 2021-09-28 PROCEDURE — 71046 X-RAY EXAM CHEST 2 VIEWS: CPT

## 2021-09-29 LAB
25(OH)D3 SERPL-MCNC: 20.6 NG/ML (ref 32–100)
ABSOLUTE LYMPHOCYTE COUNT, 10803: 2 K/UL (ref 1–4.8)
ALBUMIN SERPL-MCNC: 4 G/DL (ref 3.5–5)
ANION GAP SERPL CALC-SCNC: 11 MMOL/L (ref 3–15)
BASOPHILS # BLD: 0 K/UL (ref 0–0.2)
BASOPHILS NFR BLD: 0 % (ref 0–2)
BUN SERPL-MCNC: 14 MG/DL (ref 6–22)
CALCIUM SERPL-MCNC: 9.2 MG/DL (ref 8.4–10.5)
CHLORIDE SERPL-SCNC: 107 MMOL/L (ref 98–110)
CHOLEST SERPL-MCNC: 180 MG/DL (ref 110–200)
CO2 SERPL-SCNC: 23 MMOL/L (ref 20–32)
CREAT SERPL-MCNC: 0.8 MG/DL (ref 0.5–1.2)
EOSINOPHIL # BLD: 0.1 K/UL (ref 0–0.5)
EOSINOPHIL NFR BLD: 1 % (ref 0–6)
ERYTHROCYTE [DISTWIDTH] IN BLOOD BY AUTOMATED COUNT: 17.5 % (ref 10–15.5)
FERRITIN SERPL-MCNC: 64 NG/ML (ref 10–291)
FOLATE,FOL: 8.54 NG/ML
GFRAA, 66117: >60
GFRNA, 66118: >60
GLUCOSE SERPL-MCNC: 83 MG/DL (ref 70–99)
GRANULOCYTES,GRANS: 65 % (ref 40–75)
HCT VFR BLD AUTO: 38.6 % (ref 35.1–46.5)
HDLC SERPL-MCNC: 33 MG/DL
HDLC SERPL-MCNC: 5.5 MG/DL (ref 0–5)
HGB BLD-MCNC: 10.9 G/DL (ref 11.7–15.5)
IRON,IRN: 69 MCG/DL (ref 30–160)
LDL/HDL RATIO,LDHD: 3.2
LDLC SERPL CALC-MCNC: 105 MG/DL (ref 50–99)
LYMPHOCYTES, LYMLT: 24 % (ref 20–45)
MCH RBC QN AUTO: 23 PG (ref 26–34)
MCHC RBC AUTO-ENTMCNC: 28 G/DL (ref 31–36)
MCV RBC AUTO: 82 FL (ref 81–99)
MONOCYTES # BLD: 0.8 K/UL (ref 0.1–1)
MONOCYTES NFR BLD: 9 % (ref 3–12)
NEUTROPHILS # BLD AUTO: 5.4 K/UL (ref 1.8–7.7)
NON-HDL CHOLESTEROL, 011976: 148 MG/DL
PLATELET # BLD AUTO: 334 K/UL (ref 140–440)
PMV BLD AUTO: 10.4 FL (ref 9–13)
POTASSIUM SERPL-SCNC: 3.9 MMOL/L (ref 3.5–5.5)
RBC # BLD AUTO: 4.7 M/UL (ref 3.8–5.2)
SODIUM SERPL-SCNC: 141 MMOL/L (ref 133–145)
TRIGL SERPL-MCNC: 211 MG/DL (ref 40–149)
TSH SERPL DL<=0.005 MIU/L-ACNC: 1.22 MCU/ML (ref 0.27–4.2)
VIT B12 SERPL-MCNC: 534 PG/ML (ref 211–911)
VLDLC SERPL CALC-MCNC: 42 MG/DL (ref 8–30)
WBC # BLD AUTO: 8.3 K/UL (ref 4–11)

## 2021-10-03 LAB — VITAMIN B1, WHOLE BLOOD, 66250: 98.9 NMOL/L (ref 66.5–200)

## 2021-10-06 ENCOUNTER — DOCUMENTATION ONLY (OUTPATIENT)
Dept: BARIATRICS/WEIGHT MGMT | Age: 26
End: 2021-10-06

## 2021-10-06 ENCOUNTER — HOSPITAL ENCOUNTER (OUTPATIENT)
Dept: BARIATRICS/WEIGHT MGMT | Age: 26
Discharge: HOME | End: 2021-10-06

## 2021-10-06 NOTE — PROGRESS NOTES
[address] [place] [address] [facility], [address]    Patient's Name: Chidi Burks   Age: 32 y.o. YOB: 1995   Sex: female      Insurance:              Session: 4 of  6  Surgeon:  Dr. Andrews Fox    Height: 5 f 6 Weight:    318      Lbs. BMI:    Pounds Lost since last month: 0               Pounds Gained since last month: 0    Starting Weight: 320   Previous Months Weight: 318  Overall Pounds Lost: 2 Overall Pounds Gained: 0      Do you smoke? None    Alcohol intake:  Number of drinks at a time:  None  Number of times a week: None    Class Guidelines    Guidelines are reviewed with patient at the start of every class. 1. Patient understands that weight loss trial classes must be consecutive. Patient understands if they miss a class, it is their responsibility to contact me to reschedule class. I will reach out to patient after their first no show. 2.  Patient understands the expectations that weight maintenance/weight loss is expected during the classes. Failure to demonstrate changes may result in one extra month of weight loss trial, followed by going back to see the surgeon. 3. Patient is also instructed to be doing their labs, blood work, psych visit, support group and any other test that the surgeon has used while they are working on their weight loss trial.  4. Patient is instructed to bring their education binder to all classes. Changes Made Since Last Class: Tried to add brown rice    Eating Habits and Behaviors      Today we reviewed key diet principles. We talked about patient following a low calorie/low carbohydrate diet while they are in weight loss trials. To achieve this, patient is encouraged to avoid liquid calories, including alcohol, soda, sweet tea, and fruit juices. Patient can cut carbohydrates by trying to stick to meat and vegetables.   Patient can also eat eggs, cheese, and good fat, while trying to eliminate starches, such as pasta, rice, crackers, chips, popcorn. I also gave a power point that included 21 Ways to Stay on Track with a Healthy Lifestyle. Some of the food-related suggestions included drinking plenty of water or calorie-free beverages prior to their meal.  Patient is encouraged to to fill up on protein first, which is the ultimate fill-me up food. We talked about the importance of eating breakfast and the effects that can happen if one skips meals, which includes eating larger portions, snacking more, and decreasing their metabolism. With the suggestions in the power point, patient will be able to decrease their calories and carbohydrate intake. Patient's dietary habits include: Patient is eating 2-2.5 meals per day. Meals are made up of protein shake, salad, protein shake. Portions are:  spoonful. Patient is eating out: 1 x a week. Patient's intake of bread, rice, pasta or other carbohydrates is:  daily. Patient is snacking on fruits. Patient is eating sweets 1 x a week times a week. I have talked to patient about some lower carbohydrate snack choices that focused more protein. Patient is drinking 32 ounces of fluid per day. Fluid intake is make up of: water and 8 ounces of fruit juices. Physical Activity/Exercise    Comments: We talked about the importance of establishing a work out routine. Patient is currently walking more for activity. Goals have been set. Behavior Modification       Comments:   Some of the behavior tips that were included in the power point, include being choosy about night time snacking. Patient was encouraged to make the TV a no eating zone and not eat after 7 pm.  Patient is also encouraged to keep a food journal.      One of the other things we talked about during class is whether or not patient has a support system. Patient has been been to a support group. Goals set by Registered Dietitian:  1.  Lose more water  2. Drink more water.     Lila Grajeda Beau 87 RD  10/6/2021

## 2021-10-07 ENCOUNTER — TELEPHONE (OUTPATIENT)
Dept: SURGERY | Age: 26
End: 2021-10-07

## 2021-10-08 ENCOUNTER — TELEPHONE (OUTPATIENT)
Dept: SURGERY | Age: 26
End: 2021-10-08

## 2021-10-08 NOTE — TELEPHONE ENCOUNTER
Spoke to pt and also mailed her instructions on iron vit c and vit d see media she will speak with her pcp regarding elevated lipids

## 2021-11-03 ENCOUNTER — HOSPITAL ENCOUNTER (OUTPATIENT)
Dept: BARIATRICS/WEIGHT MGMT | Age: 26
Discharge: HOME | End: 2021-11-03

## 2021-11-03 ENCOUNTER — DOCUMENTATION ONLY (OUTPATIENT)
Dept: BARIATRICS/WEIGHT MGMT | Age: 26
End: 2021-11-03

## 2021-11-03 NOTE — PROGRESS NOTES
[address] [place] [address] [facility], [address]    Patient's Name: Praveen Salvador   Age: 32 y.o. YOB: 1995   Sex: female      Insurance:            Session: 5 of 6  Revision:   Surgeon:  Dr. Jesus Spence    Height: 5 f 6 Weight:    317      Lbs. BMI: 51.3   Pounds Lost since last month: 1               Pounds Gained since last month: 0    Starting Weight: 320   Previous Months Weight: 318  Overall Pounds Lost: 3 Overall Pounds Gained: 0      Do you smoke? None    Alcohol intake:  Number of drinks at a time:  None  Number of times a week: None    Class Guidelines    Guidelines are reviewed with patient at the start of every class. 1. Patient understands that weight loss trial classes must be consecutive. Patient understands if they miss a class, it is their responsibility to contact me to reschedule class. I will reach out to patient after their first no show. 2.  Patient understands the expectations that weight maintenance/weight loss is expected during the classes. Failure to demonstrate changes may result in one extra month of weight loss trial, followed by going back to see the surgeon. 3. Patient is also instructed to be doing their labs, blood work, psych visit, support group and any other test that the surgeon has used while they are working on their weight loss trial.    Other Pertinent Information:     Changes Made Since Last Class: None      Dietary Instruction    During today's class we continued to focus on the key diet principles. Patient was instructed to follow a low carbohydrate diet, focusing on meat and vegetables. Patient was instructed to stop liquid calories and aim for 64 ounces of water per day. In class, I also gave patient a power point on surviving the holidays.   Some of the tips included survival tips for parties, including bringing their own low carbohydrate dish to a potluck dinner and surveying the buffet line before they start filling up their plate. Patient was given cooking alternatives, including using Splenda for sugar, substituting applesauce for oil in recipes, and using low fat plain yogurt instead of sour cream in dips. Patient was also encouraged to be mindful of calories in alcohol. Patient is eating 2 meals per day. Patient states their last meal or snack of the day is at 8 pm and they eat 1 hour within waking up in the morning. Patient is encouraged to eat within 1 hour of waking up and have a cut off time in the evening. If patient is physically hungry, it is encouraged to have a protein-based snack. Patient feels that their meals are: vegetables and some starches. In terms of managing portions, patient small measuring cups. I have made suggestions to use a smaller plate or to fill up on water before eating a meal.  Patient is eating out 1 times a week. Choices when eating out include:  Chicken salad. Patient is eating bread, rice, pasta, crackers, etc. 0 times a week? Patient is snacking on: 0.  Sweet intake is 0. We talked about dumping syndrome and the effects of eating sugar. Patient is drinking 36 ounces of water, 0 ounces of soda, 0 ounces of sweet tea, 0 ounces of fruit juices, and 0 ounces of caffeine. We talked about not drinking any liquid calories. Physical Activity/Exercise    Patient is currently doing walking for 20 minutes for activity. Today's power point on surviving the holidays also included tips on exercising. This included being creative during the holiday, walking stairs, mall walking, getting resistance bands. Patient was encouraged not to be afraid to excuse themselves from the table to go for a walk after they eat. Comments:     Behavior Modification    Reinforced behavior changes to make. Patient was encouraged to keep their emotions in check.   Try to HALT and focus on whether they are eating out of hunger or if they are eating out of emotions. Other eating behaviors included surveying the buffet line before starting to fill up their plate. Patient was given a check off list and encouraged to monitor some of their eating behaviors, such as eating slowly, chewing their food thoroughly, and taking 20-30 minutes to eat a meal.    Weight loss surgery is important to the patient because:  States for her health*    Challenges or barriers that they may encounter with making changes after surgery are? Skipping meals    Patient has not been to a support group meeting. Non-Scale that patient set for next month include:  1. Lose more weight.        Sueann Skiff, Luite Beau 87 RD  11/3/2021

## 2021-12-01 ENCOUNTER — DOCUMENTATION ONLY (OUTPATIENT)
Dept: BARIATRICS/WEIGHT MGMT | Age: 26
End: 2021-12-01

## 2021-12-01 ENCOUNTER — HOSPITAL ENCOUNTER (OUTPATIENT)
Dept: BARIATRICS/WEIGHT MGMT | Age: 26
Discharge: HOME | End: 2021-12-01

## 2021-12-01 DIAGNOSIS — E66.01 MORBID OBESITY (HCC): ICD-10-CM

## 2021-12-01 DIAGNOSIS — Z01.818 PRE-OP TESTING: Primary | ICD-10-CM

## 2021-12-01 NOTE — PROGRESS NOTES
[address] [place] [facility] [place] [address] [facility], [address]    Patient's Name: Chidi Burks   Age: 32 y.o. YOB: 1995   Sex: female    Date:   12/1/2021          Session: 6 of  6   Surgeon:  Dr. Davila    Height: 5 f 6 Weight:    317      Lbs. BMI:    Pounds Lost since last month: 0               Pounds Gained since last month: 0    Starting Weight: 320   Previous Months Weight: 317  Overall Pounds Lost: 3 Overall Pounds Gained: 0      Do you smoke? Recently on Thanksgiving    Alcohol intake:  Number of drinks at a time:  None  Number of times a week: None    Class Guidelines    Guidelines are reviewed with patient at the start of every class. 1. Patient understands that weight loss trial classes must be consecutive. Patient understands if they miss a class, it is their responsibility to contact me to reschedule class. I will reach out to patient after their first no show. 2.  Patient understands the expectations that weight maintenance/weight loss is expected during the classes. Failure to demonstrate changes may result in one extra month of weight loss trial, followed by going back to see the surgeon. 3. Patient is also instructed to be doing their labs, blood work, psych visit, support group and any other test that the surgeon has used while they are working on their weight loss trial.    Other Pertinent Information:     Patient has attended support group. Changes Made Since Last Class: None    Eating Habits and Behaviors      Today we reviewed key diet principles. We talked about protein drinks and ones that would be okay. Patient was encouraged to start getting into a routine now and drinking a shake. Patient may use for a meal replacement or a snack. Patient was also encouraged to stop liquid calories. We talked about fluid choices that would be okay. We also spent a lot of time talking about carbohydrates.   Patient was encouraged to start cutting their carbohydrates out and keep them less than 100 grams per day. Patient was given examples of carbohydrates that are in food. We also talked about the power of protein and the importance of getting more protein in per day than carbohydrates. I also reviewed with patient the vitamins that they will need to take post op. Patient will hear this information again at pre op class prior to surgery, but I felt it was important to prepare them now. Patient will be taking 2 multivitamin complete per day, 100 mg of Vitamin B1, 5000 IU of Vitamin D3, 1000 mcg Vitamin B12, 1500 mg of calcium citrate. We also spent some time talking about the post op diet protocol. Patient is aware they will be on a liquid diet before surgery and then a week of liquids after surgery followed by 5 weeks of soft protein. Patient's current diet habits include: Patient is eating 2-3 meals per day. Meals are made up of heavy in carbohydrates. We have talked about eating protein first, following by vegetables. Portion sizes are measuring cups. Suggestions and tips were reviewed to help decrease portion sizes. Eating out frequency is 1 x a week. We talked about the importance of planning ahead, so eating out intake can be decreased. Carbohydrate intake (including bread, rice, pasta, cereal, crackers, chips, etc.) is: [place] I have talked to patient about the importance of filling up on protein first, which will help with satiety. Patient is snacking 0 times a day on . We talked about snacks that would be more protein-based. Patient's sweet intake is:  0. Fluid intake is:  32 ounces of water, 0 ounces of sweet tea, 0 ounces of soda, 0 ounces of fruit juices, 0 ounces of caffeine. Physical Activity/Exercise    Comments:     Currently for exercise, patient is walking a few times a week. We talked about activities for patient to do, including walking, swimming, or chair exercises. Goals have been set. Behavior Modification       Comments:  Emphasized the importance of eating slowly, not eating and drinking meals at the same time. I have also encouraged patient to work on food journaling  We talked about ways they can track their daily intake. Goals that patient set for next month include:  1. Lose more weight  2. No skipping meals.      Lila Rivera 87 RD  12/1/2021

## 2021-12-01 NOTE — PROGRESS NOTES
12/1/2021:  Patient completed her 6 month weight loss trial, but indicated on her diet history that she was smoking as recent as Thanksgiving last week. Patient contacted me and clarified that she was smoking marijuana. She understands that will delay her process by 3 months.       Sarath Varela MS RD

## 2022-03-19 PROBLEM — E66.01 MORBID OBESITY (HCC): Status: ACTIVE | Noted: 2020-02-13

## 2022-04-05 ENCOUNTER — HOSPITAL ENCOUNTER (OUTPATIENT)
Dept: BARIATRICS/WEIGHT MGMT | Age: 27
Discharge: HOME | End: 2022-04-05

## 2022-04-05 ENCOUNTER — HOSPITAL ENCOUNTER (OUTPATIENT)
Dept: LAB | Age: 27
Discharge: HOME | End: 2022-04-05

## 2022-04-05 ENCOUNTER — CLINICAL SUPPORT (OUTPATIENT)
Dept: SURGERY | Age: 27
End: 2022-04-05

## 2022-04-05 ENCOUNTER — DOCUMENTATION ONLY (OUTPATIENT)
Dept: BARIATRICS/WEIGHT MGMT | Age: 27
End: 2022-04-05

## 2022-04-05 VITALS — TEMPERATURE: 98.8 F | WEIGHT: 293 LBS | BODY MASS INDEX: 47.09 KG/M2 | RESPIRATION RATE: 18 BRPM | HEIGHT: 66 IN

## 2022-04-05 DIAGNOSIS — Z01.818 PREOPERATIVE TESTING: Primary | ICD-10-CM

## 2022-04-05 DIAGNOSIS — E66.9 OBESITY, UNSPECIFIED CLASSIFICATION, UNSPECIFIED OBESITY TYPE, UNSPECIFIED WHETHER SERIOUS COMORBIDITY PRESENT: Primary | ICD-10-CM

## 2022-04-05 LAB — SENTARA SPECIMEN COL,SENBCF: NORMAL

## 2022-04-05 PROCEDURE — 99001 SPECIMEN HANDLING PT-LAB: CPT

## 2022-04-05 RX ORDER — LANOLIN ALCOHOL/MO/W.PET/CERES
325 CREAM (GRAM) TOPICAL 2 TIMES DAILY
COMMUNITY
End: 2022-10-21

## 2022-04-05 NOTE — PROGRESS NOTES
Mary Lou Jarrell is a 32 y.o. female  Chief Complaint   Patient presents with    Testing     patient presents today for H Pylori breath test.     1. Have you been to the ER, urgent care clinic since your last visit? Hospitalized since your last visit? No    2. Have you seen or consulted any other health care providers outside of the [address] [place] [address] since your last visit? Include any pap smears or colon screening.  No

## 2022-04-05 NOTE — PROGRESS NOTES
4/5/2022:  Patient came in for a final weight check today. Starting weight: 320. Current weight: 331. Patient was given a list of changes to start working towards and we will meet again in a month for another weight check. Patient will be having her H. Pylori and will be doing her drug screening test today, as well.     Kamryn Gutierrez MS RD

## 2022-04-09 LAB — H PYLORI (UREA BREATH),1814839: NEGATIVE

## 2022-04-12 ENCOUNTER — OFFICE VISIT (OUTPATIENT)
Dept: SURGERY | Age: 27
End: 2022-04-12
Payer: MEDICAID

## 2022-04-12 VITALS
HEART RATE: 94 BPM | OXYGEN SATURATION: 98 % | TEMPERATURE: 97.9 F | DIASTOLIC BLOOD PRESSURE: 70 MMHG | WEIGHT: 293 LBS | RESPIRATION RATE: 20 BRPM | HEIGHT: 66 IN | SYSTOLIC BLOOD PRESSURE: 110 MMHG | BODY MASS INDEX: 47.09 KG/M2

## 2022-04-12 DIAGNOSIS — E78.2 MIXED HYPERLIPIDEMIA: ICD-10-CM

## 2022-04-12 DIAGNOSIS — K21.9 GASTROESOPHAGEAL REFLUX DISEASE, UNSPECIFIED WHETHER ESOPHAGITIS PRESENT: ICD-10-CM

## 2022-04-12 DIAGNOSIS — Z01.818 PREOP EXAMINATION: ICD-10-CM

## 2022-04-12 DIAGNOSIS — E66.01 MORBID OBESITY (HCC): ICD-10-CM

## 2022-04-12 DIAGNOSIS — E55.9 VITAMIN D DEFICIENCY: ICD-10-CM

## 2022-04-12 DIAGNOSIS — G47.33 OBSTRUCTIVE SLEEP APNEA SYNDROME: ICD-10-CM

## 2022-04-12 DIAGNOSIS — E66.01 MORBID OBESITY (HCC): Primary | ICD-10-CM

## 2022-04-12 DIAGNOSIS — E28.2 PCOS (POLYCYSTIC OVARIAN SYNDROME): ICD-10-CM

## 2022-04-12 PROBLEM — G89.29 CHRONIC PAIN OF RIGHT ANKLE: Status: ACTIVE | Noted: 2021-06-15

## 2022-04-12 PROBLEM — F32.A DEPRESSION: Status: ACTIVE | Noted: 2019-04-02

## 2022-04-12 PROBLEM — M25.571 CHRONIC PAIN OF RIGHT ANKLE: Status: ACTIVE | Noted: 2021-06-15

## 2022-04-12 PROBLEM — R00.0 TACHYCARDIA: Status: ACTIVE | Noted: 2022-04-12

## 2022-04-12 PROCEDURE — 99215 OFFICE O/P EST HI 40 MIN: CPT | Performed by: SURGERY

## 2022-04-12 RX ORDER — METOPROLOL SUCCINATE 25 MG/1
TABLET, EXTENDED RELEASE ORAL
COMMUNITY
Start: 2022-04-01 | End: 2022-04-12

## 2022-04-12 RX ORDER — TOPIRAMATE 50 MG/1
TABLET, FILM COATED ORAL
COMMUNITY
Start: 2022-03-23 | End: 2022-04-12

## 2022-04-12 RX ORDER — OMEPRAZOLE 20 MG/1
20 CAPSULE, DELAYED RELEASE ORAL DAILY
COMMUNITY
Start: 2022-03-02 | End: 2022-08-30

## 2022-04-12 NOTE — PROGRESS NOTES
Chief Complaint   Patient presents with    Advice Only     confirmed video re-enrolling into program had seen dr Bailey Rather previously     Pt ID confirmed    Weight Loss Metrics 4/12/2022 4/12/2022 4/5/2022 4/5/2022 6/17/2021 6/14/2021 6/14/2021   Pre op / Initial Wt 333 - 329 - - 320 -   Today's Wt - 333 lb - 329 lb 320 lb - 320 lb   BMI - 53.75 kg/m2 - 53.1 kg/m2 51.65 kg/m2 - 51.65 kg/m2   Ideal Body Wt 137 - 137 - - 137 -   Excess Body Wt 196 - 192 - - 183 -   Goal Wt 176 - 176 - - 174 -   Wt loss to date 0 - 0 - - 0 -   % Wt Loss 0 - 0 - - 0 -   80% .8 - 153.6 - - 146.4 -       Body mass index is 53.75 kg/m².
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
## 2022-04-22 ENCOUNTER — DOCUMENTATION ONLY (OUTPATIENT)
Dept: BARIATRICS/WEIGHT MGMT | Age: 27
End: 2022-04-22

## 2022-04-22 ENCOUNTER — HOSPITAL ENCOUNTER (OUTPATIENT)
Dept: BARIATRICS/WEIGHT MGMT | Age: 27
Discharge: HOME | End: 2022-04-22

## 2022-04-22 NOTE — PROGRESS NOTES
[address] [facility] [place] [address] [facility], [address]    Patient's Name: Mary Lou Jarrell   Age: 32 y.o. YOB: 1995   Sex: female    Date:   4/22/2022    Insurance:              Session: 1 of 6  Surgeon:  Dr. Arie Ornelas    Height: 5 f 6   Weight:    332      Lbs. BMI:    Pounds Lost since last month: 1                 Pounds Gained since last month: 0    Starting Weight: 333     Previous Months Weight: 333  Overall Pounds Lost: 1   Overall Pounds Gained: 0    Patient has not been to a support group meeting. Do you smoke? None    Alcohol intake:  Number of drinks at a time:  None  Number of times a week: None    Class Guidelines    Guidelines are reviewed with patient at the start of every class. 1. Patient understands that weight loss trial classes must be consecutive. Patient understands if they miss a class, it is their responsibility to contact me to reschedule class. I will reach out to patient after their first no show. 2.  Patient understands the expectations that weight maintenance/weight loss is expected during the classes. Failure to demonstrate changes may result in one extra month of weight loss trial, followed by going back to see the surgeon. Patient understands that they CAN NOT gain any weight during the weight loss trial.  Gaining weight will result in extra classes. 3. Patient is also instructed to be doing their labs, blood work, psych visit, support group and any other test that the surgeon has used while they are working on their weight loss trial.  4.  Patient was instructed to bring their blue binder to every class and appointment. Other Pertinent Information:     Changes Made Since Last Class:     Eating Habits and Behaviors      We started off class today by reviewing key diet principles.   Patient was given a very specific list of foods that they can eat, which included meat, fish, vegetables, eggs, cheese, fats, soy, and berries. Patient was also given a list of foods that should be avoided. These included sweets (candy, soda, baked goods, ice cream), and starches, including pasta, rice, crackers, chips, oatmeal, bread. We talked about appropriate protein-based snacks, including deli meat, low fat cheese, yogurt, hummus, small handful of almonds. We talked about working on sipping fluid throughout the day and working towards 64 ounces. I have recommended water, Crystal light, sugar free drinks, but eliminating soda, sweet tea, and fruit juices. I also gave a power point on ways to help with the metabolism. Some ways that can help boost one's metabolism include healthy snacking. Eating 6 small meals in the pre op phase can help keep the metabolism revved up. It is recommended to focus on protein-based snacks. Exercise is another way to increase resting metabolic rate. The more lean muscle one has, the more calories they will burn at rest.  Dehydration can slow down one's metabolism, as well. Patient is encouraged to keep sipping to work towards 64 ounces of fluid per day. Protein is another booster for metabolism. Foods high in carbohydrates and fat slow down the metabolism. Patient's current diet habits include: Patient is eating 3 meals a day. Patient is eating turkey coronel and eggs, 3 chicken tenders with broccoli, premier protein shake at meals. Patient states their portion sizes are regular plate. I have encouraged patient to use a smaller plate and fill up on water before meals to help cut down on portions. Patient is eating fast food: 0.  Carry out intake is: 0. Sit down restaurant intake is: 0. Patient's is eating bread, rice, pasta, cereal, and other carbohydrates 0. Patient is snacking on protein bar. This is being done 1 times a day. Patient's sweet intake is 0. I have talked about the importance of getting into the habit now of cutting the sweets out.       Fluid intake:  48 ounces of water, crystal light, unsweetened tea.  0 ounces of soda, 0 ounces of sweet tea, 0 ounces of fruit juice, 0 ounces of caffeine. Patient is encouraged not to drink calories and stick to non-carbonated, non-caffeine, sugar free drinks. The goal is 64 ounces of fluid per day. Physical Activity/Exercise    Comments:     Currently for exercise, patient is walking 30 minutes per day. We talked about activities for patient to do, including walking, swimming, or chair exercises. I also talked with patient about doing some strength training, which helps the metabolism, as well. Patient understands the importance of establishing an activity routine and that surgery is only a small piece of puzzle, but exercise and diet changes will play a role in long term success. Behavior Modification       Comments: In the power point, Mastering Your Metabolism, I also gave behaviors that can help with one's metabolism. These include not skipping meals and being sure to feed and fuel that body rather than going large gaps and putting the body in starvation mode. Patient is encouraged to eat within 1 hour of waking up and have a cut off time in the evening. We talked about night time syndrome where a person may skip breakfast and lunch and then consume the majority of their calories from dinner until bed time. I  have talked about how important it it to get 3 meals in per day, eat breakfast, and BREAK THE FAST. One goal for next month includes:  1.   More water intake      Sumaya Jeffries RD  4/22/2022

## 2022-05-25 ENCOUNTER — HOSPITAL ENCOUNTER (OUTPATIENT)
Dept: BARIATRICS/WEIGHT MGMT | Age: 27
Discharge: HOME | End: 2022-05-25

## 2022-05-25 ENCOUNTER — DOCUMENTATION ONLY (OUTPATIENT)
Dept: BARIATRICS/WEIGHT MGMT | Age: 27
End: 2022-05-25

## 2022-05-25 NOTE — PROGRESS NOTES
[address] [facility] [place] [address], [address]    Patient's Name: Thuy Garcia   Age: 32 y.o. YOB: 1995   Sex: female        Insurance:             Session: 2 of 6  Revision:     Surgeon:  Dr. Nam Ahmadi    Height: 5 f 6   Weight:    327      Lbs. BMI:    Pounds Lost since last month: 5                 Pounds Gained since last month: 0    Starting Weight: 333     Previous Months Weight: 332  Overall Pounds Lost: 6   Overall Pounds Gained: 0    Patient hasNone been to support group. Do you smoke? 2 cups a week    Alcohol intake:  Number of drinks at a time:    Number of times a week:     Class Guidelines    Guidelines are reviewed with patient at the start of every class. 1. Patient understands that weight loss trial classes must be consecutive. Patient understands if they miss a class, it is their responsibility to contact me to reschedule class. I will reach out to patient after their first no show. 2.  Patient understands the expectations that weight maintenance/weight loss is expected during the classes. Failure to demonstrate changes may result in one extra month of weight loss trial, followed by going back to see the surgeon. Patient understands that they CAN NOT gain any weight during the weight loss trial.  Gaining weight will result in extra classes. 3. Patient is also instructed to be doing their labs, blood work, psych visit, support group and any other test that the surgeon has used while they are working on their weight loss trial.  4.  Patient was instructed to bring their blue binder to every class and appointment. Other Pertinent Information:     Changes Made Since Last Class:     Eating Habits and Behaviors      Today in class we talked about the key diet principles.   We start off each class talking about these principles, which include cutting out liquid calories and focusing on water or other non-calorie, non-carbonated drinks. We also spent time talking about carbohydrates, including foods that have carbohydrates and the goal to keep daily carbohydrates under 100 grams per day. Patient was given ideas of meal and snack choices that are lower in carbohydrates and focus more on protein. Patient was encouraged to start trying protein shakes and was given a list of suggestions. The main topic of class today was: Portion Control. We reviewed in class a power point filled with tips on ways to control portions, including using smaller plates, boxing up portions at a restaurant before starting to eat, and not eating from the container, but rather portioning snacks into smaller bags. Patient's were encouraged to food journal, which helps increase awareness of what and how much they are eating. It was emphasized to patient the importance of reading labels and portion sizes, but also applying these portion sizes. Patient was given a list of items that can help to make portion control easier. For example, a deck of cards or a palm of a hand is a proper portion of meat, a fist is a cup or a proper serving of vegetables. Patient was given 10 tips to help with the portion control. Patient's current diet habits include: Patient's current diet habits include: Patient is eating 3 meals a day. Patient states their portion sizes are small plate. I have encouraged patient to use a smaller plate and fill up on water before meals to help cut down on portions. Patient is eating fast food: 1 x a week. Carry out intake is: 0. Sit down restaurant intake is: 0. Patient's is eating bread, rice, pasta, cereal, and other carbohydrates None. Patient is snacking on sunflower seeds. This is being done 1 times a day. Patient's sweet intake is None. I have talked about the importance of getting into the habit now of cutting the sweets out.       Fluid intake:  64 ounces of water, crystal light, unsweetened tea.  12 ounces of soda, 0 ounces of sweet tea, 0 ounces of fruit juice, 8 ounces of caffeine. Patient is encouraged not to drink calories and stick to non-carbonated, non-caffeine, sugar free drinks. The goal is 64 ounces of fluid per day. Physical Activity/Exercise    Comments:     Currently for exercise, patient is doing some walking. Patient was given a list of ideas for activity and was encouraged to incorporate 30 minutes a day into their daily routine. Behavior Modification       Comments: In class, we also focused on the behavior aspects of weight management. This includes being a mindful eater and not eating in front of the TV. Patient is also encouraged to take 20 minutes to eat a meal and eat at a table. Patient states they are currently taking 20 minutes to eat a meal.    One goal for next month includes:  1.   Work on meal prep      Haze Harada, [address]  5/25/2022

## 2022-06-01 ENCOUNTER — HOSPITAL ENCOUNTER (OUTPATIENT)
Dept: GENERAL RADIOLOGY | Age: 27
Discharge: HOME | End: 2022-06-01
Attending: SURGERY
Payer: MEDICAID

## 2022-06-01 DIAGNOSIS — K21.9 GASTROESOPHAGEAL REFLUX DISEASE, UNSPECIFIED WHETHER ESOPHAGITIS PRESENT: ICD-10-CM

## 2022-06-01 PROCEDURE — 74246 X-RAY XM UPR GI TRC 2CNTRST: CPT

## 2022-06-01 PROCEDURE — 74011000250 HC RX REV CODE- 250: Performed by: SURGERY

## 2022-06-01 RX ADMIN — BARIUM SULFATE 135 ML: 980 POWDER, FOR SUSPENSION ORAL at 12:01

## 2022-06-01 RX ADMIN — BARIUM SULFATE 70 G: 960 POWDER, FOR SUSPENSION ORAL at 12:00

## 2022-06-01 RX ADMIN — ANTACID/ANTIFLATULENT 8 G: 380; 550; 10; 10 GRANULE, EFFERVESCENT ORAL at 12:02

## 2022-06-24 ENCOUNTER — DOCUMENTATION ONLY (OUTPATIENT)
Dept: BARIATRICS/WEIGHT MGMT | Age: 27
End: 2022-06-24

## 2022-06-24 ENCOUNTER — HOSPITAL ENCOUNTER (OUTPATIENT)
Dept: BARIATRICS/WEIGHT MGMT | Age: 27
Discharge: HOME | End: 2022-06-24

## 2022-06-24 NOTE — PROGRESS NOTES
[address] [facility] [place] [address], [address]    Patient's Name: Liz Kellogg   Age: 32 y.o. YOB: 1995   Sex: female    Date:   6/24/2022   Insurance:            Session: 3 of  6  Revision:   Surgeon:  Dr. Jose F Linares    Height: 5 f 6 Weight:    324      Lbs. BMI:    Pounds Lost since last month: 3               Pounds Gained since last month: 0    Starting Weight: 333   Previous Months Weight: 327  Overall Pounds Lost: 9 Overall Pounds Gained: 0      Do you smoke? None    Alcohol intake:  Number of drinks at a time:  None  Number of times a week: None    Class Guidelines    Guidelines are reviewed with patient at the start of every class. 1. Patient understands that weight loss trial classes must be consecutive. Patient understands if they miss a class, it is their responsibility to contact me to reschedule class. I will reach out to patient after their first no show. 2.  Patient understands the expectations that weight maintenance/weight loss is expected during the classes. Failure to demonstrate changes may result in one extra month of weight loss trial, followed by going back to see the surgeon. Patient understands that they CAN NOT gain any weight during the weight loss trial.  Gaining weight will result in extra classes. 3. Patient is also instructed to be doing their labs, blood work, psych visit, support group and any other test that the surgeon has used while they are working on their weight loss trial.  4.  Patient was instructed to bring their blue binder to every class and appointment. Other Pertinent Information:     Changes Made Since Last Class: None    Eating Habits and Behaviors    Today in class, I reviewed a power point that discussed Nutrition, Behavior, and Exercise changes to start working on.   Some of the eating behaviors that we discussed included the importance of eating breakfast.  We talked about some of the reasons that people don't eat breakfast and food choices that would be appropriate. We also talked about avoiding liquid calories. Patient is encouraged to aim for 64 ounces of fluid per day and trying to get them from water, Crystal Light, sugar free drinks. We also talked about eating out options that are healthier. Patient was encouraged to always request sauces and dressings on the side and request a salad, broth-based soup, or vegetables in place of fries. Patient's current diet habits include: Patient's current diet habits include: Patient is eating 3 meals a day. Patient states their portion sizes are small portions. I have encouraged patient to use a smaller plate and fill up on water before meals to help cut down on portions. Patient is eating fast food: None. Carry out intake is: 1 x a week. Sit down restaurant intake is: None. Patient's is eating bread, rice, pasta, cereal, and other carbohydrates None. Patient is snacking on pretzels. This is being done over 3 times a day. Patient's sweet intake is None. I have talked about the importance of getting into the habit now of cutting the sweets out. Fluid intake:  64 ounces of water, crystal light, unsweetened tea.  0 ounces of soda, 0 ounces of sweet tea, 0 ounces of fruit juice, 0 ounces of caffeine. Patient is encouraged not to drink calories and stick to non-carbonated, non-caffeine, sugar free drinks. The goal is 64 ounces of fluid per day. Physical Activity/Exercise    Comments: We talked about exercise. Patient was given reasons of why exercise is so important and how that can help with their long-term success. I have encouraged patient to get a support system to help with the activity. Currently for activity, patient is doing just walking. We have talked about goals for activity to incorporated. Behavior Modification       Comments: We also talked about behavior modifications.   We talked about eating triggers, such as eating in front of the TV and solutions, such as making the TV a no eating zone. If patient is eating out out of emotion, food will only temporarily solve that. Patient is encouraged to HALT and assess if they are eating because they are Hungry, or out of emotions: Anxious, Lonely, Tired, which the food will only temporarily solve. We also talked about ways to prevent relapse. Goals that patient wants to work on includes:  1. Walking longer distance  2.  Being healthier      Lila Montez 87 RD  6/24/2022

## 2022-07-13 ENCOUNTER — HOSPITAL ENCOUNTER (OUTPATIENT)
Dept: LAB | Age: 27
Discharge: HOME | End: 2022-07-13
Payer: MEDICAID

## 2022-07-13 ENCOUNTER — HOSPITAL ENCOUNTER (OUTPATIENT)
Dept: GENERAL RADIOLOGY | Age: 27
Discharge: HOME | End: 2022-07-13
Payer: MEDICAID

## 2022-07-13 ENCOUNTER — HOSPITAL ENCOUNTER (OUTPATIENT)
Dept: LAB | Age: 27
Discharge: HOME | End: 2022-07-13

## 2022-07-13 DIAGNOSIS — Z01.818 PREOP EXAMINATION: ICD-10-CM

## 2022-07-13 DIAGNOSIS — E66.01 MORBID OBESITY (HCC): ICD-10-CM

## 2022-07-13 DIAGNOSIS — G47.33 OBSTRUCTIVE SLEEP APNEA SYNDROME: ICD-10-CM

## 2022-07-13 LAB
ATRIAL RATE: 95 BPM
CALCULATED P AXIS, ECG09: 45 DEGREES
CALCULATED R AXIS, ECG10: 28 DEGREES
CALCULATED T AXIS, ECG11: 23 DEGREES
DIAGNOSIS, 93000: NORMAL
P-R INTERVAL, ECG05: 140 MS
Q-T INTERVAL, ECG07: 342 MS
QRS DURATION, ECG06: 80 MS
QTC CALCULATION (BEZET), ECG08: 429 MS
SENTARA SPECIMEN COL,SENBCF: NORMAL
VENTRICULAR RATE, ECG03: 95 BPM

## 2022-07-13 PROCEDURE — 71046 X-RAY EXAM CHEST 2 VIEWS: CPT

## 2022-07-13 PROCEDURE — 99001 SPECIMEN HANDLING PT-LAB: CPT

## 2022-07-13 PROCEDURE — 93005 ELECTROCARDIOGRAM TRACING: CPT

## 2022-07-14 LAB
25(OH)D3 SERPL-MCNC: 31.9 NG/ML (ref 32–100)
A-G RATIO,AGRAT: 1.6 RATIO (ref 1.1–2.6)
ABSOLUTE LYMPHOCYTE COUNT, 10803: 1.6 K/UL (ref 1–4.8)
ALBUMIN SERPL-MCNC: 4.1 G/DL (ref 3.5–5)
ALP SERPL-CCNC: 53 U/L (ref 25–115)
ALT SERPL-CCNC: 28 U/L (ref 5–40)
ANION GAP SERPL CALC-SCNC: 11 MMOL/L (ref 3–15)
AST SERPL W P-5'-P-CCNC: 17 U/L (ref 10–37)
AVG GLU, 10930: 105 MG/DL (ref 91–123)
BASOPHILS # BLD: 0 K/UL (ref 0–0.2)
BASOPHILS NFR BLD: 0 % (ref 0–2)
BILIRUB SERPL-MCNC: 0.2 MG/DL (ref 0.2–1.2)
BUN SERPL-MCNC: 11 MG/DL (ref 6–22)
CALCIUM SERPL-MCNC: 9.2 MG/DL (ref 8.4–10.5)
CHLORIDE SERPL-SCNC: 106 MMOL/L (ref 98–110)
CO2 SERPL-SCNC: 22 MMOL/L (ref 20–32)
CREAT SERPL-MCNC: 0.8 MG/DL (ref 0.5–1.2)
EOSINOPHIL # BLD: 0.1 K/UL (ref 0–0.5)
EOSINOPHIL NFR BLD: 2 % (ref 0–6)
ERYTHROCYTE [DISTWIDTH] IN BLOOD BY AUTOMATED COUNT: 15.7 % (ref 10–15.5)
GLOBULIN,GLOB: 2.6 G/DL (ref 2–4)
GLOMERULAR FILTRATION RATE: >60 ML/MIN/1.73 SQ.M.
GLUCOSE SERPL-MCNC: 82 MG/DL (ref 70–99)
GRANULOCYTES,GRANS: 66 % (ref 40–75)
HBA1C MFR BLD HPLC: 5.3 % (ref 4.8–5.6)
HCT VFR BLD AUTO: 39.5 % (ref 35.1–46.5)
HGB BLD-MCNC: 11.9 G/DL (ref 11.7–15.5)
LYMPHOCYTES, LYMLT: 23 % (ref 20–45)
MCH RBC QN AUTO: 24 PG (ref 26–34)
MCHC RBC AUTO-ENTMCNC: 30 G/DL (ref 31–36)
MCV RBC AUTO: 78 FL (ref 80–99)
MONOCYTES # BLD: 0.6 K/UL (ref 0.1–1)
MONOCYTES NFR BLD: 9 % (ref 3–12)
NEUTROPHILS # BLD AUTO: 4.4 K/UL (ref 1.8–7.7)
PLATELET # BLD AUTO: 313 K/UL (ref 140–440)
PMV BLD AUTO: 10.2 FL (ref 9–13)
POTASSIUM SERPL-SCNC: 4.4 MMOL/L (ref 3.5–5.5)
PROT SERPL-MCNC: 6.7 G/DL (ref 6.4–8.3)
RBC # BLD AUTO: 5.06 M/UL (ref 3.8–5.2)
SODIUM SERPL-SCNC: 139 MMOL/L (ref 133–145)
WBC # BLD AUTO: 6.7 K/UL (ref 4–11)

## 2022-07-20 ENCOUNTER — DOCUMENTATION ONLY (OUTPATIENT)
Dept: BARIATRICS/WEIGHT MGMT | Age: 27
End: 2022-07-20

## 2022-07-20 ENCOUNTER — HOSPITAL ENCOUNTER (OUTPATIENT)
Dept: BARIATRICS/WEIGHT MGMT | Age: 27
Discharge: HOME | End: 2022-07-20

## 2022-07-20 NOTE — PROGRESS NOTES
[address] [place] [address] [facility], [address]    Patient's Name: Darell Duke   Age: 32 y.o. YOB: 1995   Sex: female    Date:   7/20/2022    Insurance:              Session: 4 of 6  Revision:     Surgeon:  Dr. Siegel Has    Height: 5 f 6   Weight:    319      Lbs. BMI:    Pounds Lost since last month: 5                 Pounds Gained since last month: 0    Starting Weight: 333     Previous Months Weight: 324  Overall Pounds Lost: 15   Overall Pounds Gained: 0      Do you smoke? None    Alcohol intake:  Number of drinks at a time:  None  Number of times a week: None    Patient has been to a support group. Class Guidelines    Guidelines are reviewed with patient at the start of every class. 1. Patient understands that weight loss trial classes must be consecutive. Patient understands if they miss a class, it is their responsibility to contact me to reschedule class. I will reach out to patient after their first no show. 2.  Patient understands the expectations that weight maintenance/weight loss is expected during the classes. Failure to demonstrate changes may result in one extra month of weight loss trial, followed by going back to see the surgeon. Patient understands that they CAN NOT gain any weight during the weight loss trial.  Gaining weight will result in extra classes. 3. Patient is also instructed to be doing their labs, blood work, psych visit, support group and any other test that the surgeon has used while they are working on their weight loss trial.  4.  Patient was instructed to bring their blue binder to every class and appointment. Other Pertinent Information:     Changes Made Since Last Class: smaller portions    Eating Habits and Behaviors    Today in class, we started talking about the key diet principles. We first focused on stopping liquid calories. Patient was also educated on carbohydrates. Patient was instructed to start cutting out bread, rice, and pasta from the diet and start focusing more on meat and vegetables. I then gave a power point, which focused on Label Reading. In class, I gave patients a labels and we worked through a series of questions to help patients have a better understanding of label reading. Patient was instructed to review the serving size. Patient was encouraged to focus on protein and carbohydrates. We also did a few label reading activities to help the patient become more familiar with label reading. Patient's current diet habits include: Patient's current diet habits include: Patient is eating 3 meals a day. Patient states their portion sizes are small portions. I have encouraged patient to use a smaller plate and fill up on water before meals to help cut down on portions. Patient is eating fast food: None. Carry out intake is: None. Sit down restaurant intake is: n/a. Patient's is eating bread, rice, pasta, cereal, and other carbohydrates None. Patient is snacking on bag of small. This is being done 1 times a week. Patient's sweet intake is None. I have talked about the importance of getting into the habit now of cutting the sweets out. Fluid intake:  64 ounces of water, crystal light, unsweetened tea. 8 ounces of soda, 0 ounces of sweet tea, 0 ounces of fruit juice, 0 ounces of caffeine. Patient is encouraged not to drink calories and stick to non-carbonated, non-caffeine, sugar free drinks. The goal is 64 ounces of fluid per day. Physical Activity/Exercise    Comments: We talked about exercise. Patient was given reasons of why exercise is so important and how that can help with their long-term success. I have encouraged patient to get a support system to help with the activity. Currently for activity, patient is doing walking mostly. Behavior Modification       Comments:  Behavior modifications were reinforced.   This included not eating in front of the TV, which could lead to bigger portions and eating when one is not hungry. We also talked about the importance of eating 3 meals per day. Patient was encouraged to food journal to keep their daily carbohydrates less than 30 grams per meal.      Goals that patient wants to work on includes:  1. Trying to switch to more organicfoods  2.        Sumaya Clayton RD  7/20/2022

## 2022-08-17 ENCOUNTER — DOCUMENTATION ONLY (OUTPATIENT)
Dept: BARIATRICS/WEIGHT MGMT | Age: 27
End: 2022-08-17

## 2022-08-17 ENCOUNTER — HOSPITAL ENCOUNTER (OUTPATIENT)
Dept: BARIATRICS/WEIGHT MGMT | Age: 27
Discharge: HOME | End: 2022-08-17

## 2022-08-17 NOTE — PROGRESS NOTES
[facility] [address] [place] [address] [facility], [address]    Patient's Name: Cheo Burrell   Age: 32 y.o. YOB: 1995   Sex: female        Insurance:              Session: 5 of  6  Surgeon:  Dr. Kurtis Herrera    Height: 5 f 6   Current Weight:    318      Lbs. BMI:    Pounds Lost since last month: 1                 Pounds Gained since last month: 0      Starting Weight: 333     Previous Months Weight: 319  Overall Pounds Lost: 15   Overall Pounds Gained: 0    Do you smoke? None    Alcohol intake:  Number of drinks at a time:  None  Number of times a week: None    Class Guidelines    Guidelines are reviewed with patient at the start of every class. 1. Patient understands that weight loss trial classes must be consecutive. Patient understands if they miss a class, it is their responsibility to contact me to reschedule class. I will reach out to patient after their first no show. 2.  Patient understands the expectations that weight maintenance/weight loss is expected during the classes. Failure to demonstrate changes may result in one extra month of weight loss trial, followed by going back to see the surgeon. Patient understands that they CAN NOT gain any weight during the weight loss trial.  Gaining weight will result in extra classes. 3. Patient is also instructed to be doing their labs, blood work, psych visit, support group and any other test that the surgeon has used while they are working on their weight loss trial.  4.  Patient was instructed to bring their blue binder to every class and appointment. Other Pertinent Information:     Changes Made Since Last Class:     Eating Habits and Behaviors    Today in class, we reviewed the key diet principles. I have talked to patient about pushing the fluid and working towards 64 ounces per day. We focused on following a low-calorie diet.   Patient was instructed to count their carbohydrates and try to keep their daily intake under 75 grams per day and try to keep their daily protein at 80 grams per day. Patient was given examples of carbohydrates in starches. Patient was encouraged to focus on meat and vegetables and begin cutting carbohydrates out. We talked about foods that are protein-based and how to incorporate those into their meals. I also reviewed with patient the importance of eating 3 meals per day and suggestions were made for breakfast items. Patient's current diet habits include: Patient is eating 3 meals per day. Meals focus on: protein and vegetables. Patient is encouraged to fill up on protein first, then vegetables, and work on cutting back on the carbohydrates. Portions are: small portions. Patient is encouraged to use a smaller plate to help cut down on portion sizes. Patient is eating fast food: once in a while. Patient is eating carry out: 0 times a week. Patient is eating at a sit down restaurant: 0 times a week. Patient is eating bread, rice, and pasta:  0. Patient is snacking on trail mix. This is being done 1 times a week. Patient is eating sweets: none. Patient is drinking 64 ounces of water, 0 ounces of soda, 0 ounces of sweet tea, 0 ounces of fruit juices, 0 ounces of caffeine. Patient is encouraged to continue to cut out liquid calories and aim for achieving 64 ounces of fluid per day. Physical Activity/Exercise    Comments: We talked about exercise. Patient was given reasons of why exercise is so important and how that can help with their long-term success. I have encouraged patient to get a support system to help with the activity. For activity, patient is treadmill. We have talked about goals, which include starting off walking and building upon that. Patient is also encouraged to add in some light weights to get some strength training.      Behavior Modification       Comments:  During today's lesson, I gave a presentation called The 100-200 Calorie \"Mindless Margin. \"  The goal is to make modest daily 100-200 calorie reductions in certain things that the body won't notice. One, 100-200 calorie change and would will look 10-20 pounds in one year. An example could be cutting soda. Patient was given a check off list and was encouraged to come up with 1-3 100 calories changes they could make. The check off list is a daily tracker to see if these goals are being met. Goals that patient wants to work on includes:  1. Eating different green  2.        Lila Mandujano Beau 87 RD  8/17/2022

## 2022-08-30 ENCOUNTER — OFFICE VISIT (OUTPATIENT)
Dept: SURGERY | Age: 27
End: 2022-08-30
Payer: MEDICAID

## 2022-08-30 VITALS
WEIGHT: 293 LBS | RESPIRATION RATE: 18 BRPM | HEART RATE: 110 BPM | TEMPERATURE: 97.8 F | HEIGHT: 66 IN | SYSTOLIC BLOOD PRESSURE: 116 MMHG | DIASTOLIC BLOOD PRESSURE: 68 MMHG | BODY MASS INDEX: 47.09 KG/M2 | OXYGEN SATURATION: 98 %

## 2022-08-30 DIAGNOSIS — K21.9 GASTROESOPHAGEAL REFLUX DISEASE WITHOUT ESOPHAGITIS: ICD-10-CM

## 2022-08-30 DIAGNOSIS — E66.01 MORBID OBESITY (HCC): ICD-10-CM

## 2022-08-30 DIAGNOSIS — E55.9 VITAMIN D DEFICIENCY: ICD-10-CM

## 2022-08-30 DIAGNOSIS — Z71.89 ENCOUNTER FOR PRE-BARIATRIC SURGERY COUNSELING AND EDUCATION: Primary | ICD-10-CM

## 2022-08-30 PROCEDURE — 99213 OFFICE O/P EST LOW 20 MIN: CPT | Performed by: REGISTERED NURSE
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
## 2022-09-14 ENCOUNTER — DOCUMENTATION ONLY (OUTPATIENT)
Dept: BARIATRICS/WEIGHT MGMT | Age: 27
End: 2022-09-14

## 2022-09-14 ENCOUNTER — HOSPITAL ENCOUNTER (OUTPATIENT)
Dept: BARIATRICS/WEIGHT MGMT | Age: 27
Discharge: HOME | End: 2022-09-14

## 2022-09-14 NOTE — PROGRESS NOTES
[address] [place] [address] [facility], [address]    Patient's Name: Lori Maki   Age: 32 y.o. YOB: 1995   Sex: female    Date:   9/14/2022    Insurance:              Session: 6 of  6  Surgeon:  Dr. Naaman Buerger    Height: 5 f 6   Weight:    318      Lbs. BMI:    Pounds Lost since last month: 0                Pounds Gained since last month: 0    Starting Weight: 333    Previous Months Weight: 318  Overall Pounds Lost: 15   Overall Pounds Gained: 0    Smoking:  None    Alcohol intake:  Number of drinks at a time:  None  Number of times a week: None    Patient has attended the required bariatric support group. Class Guidelines    Guidelines are reviewed with patient at the start of every class. 1. Patient understands that weight loss trial classes must be consecutive. Patient understands if they miss a class, it is their responsibility to contact me to reschedule class. I will reach out to patient after their first no show. 2.  Patient understands the expectations that weight maintenance/weight loss is expected during the classes. Failure to demonstrate changes may result in one extra month of weight loss trial, followed by going back to see the surgeon. 3. Patient is also instructed to be doing their labs, blood work, psych visit, support group and any other test that the surgeon has used while they are working on their weight loss trial.    Other Pertinent Information:     Changes Made Since Last Class:     Eating Habits and Behaviors      During today's class, we continued to focus on the key diet principles. Patient was instructed to follow a low carbohydrate diet, focusing on meat and vegetables. Patient was instructed to stop liquid calories and aim for 64 ounces of water per day.  We focused on focusing in on bigger problem areas to start making changes to, such as reducing fast food intake, reducing carbonated beverages/soda intake, decreasing carbohydrates intake daily, etc. We reviewed protein shakes and high protein yogurts to chose, as well. During today's class, we also talked about how to read a label. Patient was given information on: The benefits of reading a label, which allowed one to compare the nutritional value of similar products and make healthy food decisions. The ingredient list, which can help to determine if the food is heathy or something that fits into the diet. The importance of reading the serving size and making sure to apply that to the portion size that they are consuming. Patient was also educated on carbohydrates. I talked to patient about: The function of carbohydrates. Foods that carbohydrate-heavy. Patient was given the guidelines to keep their carbohydrates less than 75 grams per day in the pre-op phase. Patient was also given ideas of low carb swaps, which include zucchini noodles, spaghetti squash, or cauliflower rice. Lower carbohydrate fruit options were discussed. Discussed lower carb swaps to use instead of potato chips. Patient's dietary habits include: Patient is eating 3 small meals per day. Meals are made up of vegetables, protein supplements. Portions are:  small. Patient's frequency of fast food is: None  Patient's frequency of carry out is: None  Patient's intake of sit down: None  Patient is eating carbohydrates: None  Patient is snacking on string cheese, ham or turkey. This is being done: every now and then. I have talked to patient about some lower carbohydrate snack choices that focused more protein. Patient is drinking 64 ounces of fluid per day. Fluid intake is make up of: water and 1 protein shake. Patient is encouraged to focus on non-caloric, non-caffeine, non-carbonated liquids. Physical Activity/Exercise     Comments:     Currently for exercise, patient is walking.   I have talked to patient about some suggestions to start an exercise routine. Patient is encouraged to purchase a pedometer and use this to track her steps. I have made some suggestions to patient of ways to incorporate exercise in with a busy lifestyle. We also talked about You Tube videos that can be used for an exercise routine. Behavior Modification  Comments:  Behavior modifications were discussed with the patient. Some of those behavior modifications include:  Emphasized the importance of eating slowly, not eating and drinking meals at the same time. Taking 20-30 minutes to eat a meal.  Patient is taking 10 minutes to eat a meal.  I have encouraged patient to follow journal, which may be done by paper or tracking it an isaiah, such as My [employer]itness Pal or [employer]. [address] [place]. Patient understands the importance of following through with these behaviors following surgery to aid in long term weight loss. Tips and advice were given on how to start implementing these into the patient's life. Goal patient has set for next month:  Continue with key diet principles  2.       Lila Levy Beau 87 RD  9/14/2022

## 2022-09-14 NOTE — PROGRESS NOTES
Nutrition Evaluation    Patient's Name: Wesley Moss   Age: 32 y.o. YOB: 1995   Sex: female    Height: 5 f 6 Weight: 318 BMI:    Starting Weight:  333        Smoking Status:  None  Alcohol Intake:  Number of Drinks at a Time: None  Number of Times a Week: None    Changes made during classes include:  Walking 30 minutes per day. Drinking water  Eating more vegetables    Summary:  I feel that Wesley Moss has demonstrated appropriate diet changes and is ready to move forward with surgery. Patient has been briefed on the importance of the protein drinks, vitamins, and the transition of the diet stages. Patient understands that the long-term diet will focus on protein and vegetables. Patient understand the effects of carbohydrates after surgery and what reactive hypoglycemia is. Patient is aware that they will be attending pre-op class 2 weeks before surgery and will get more detailed information on the post-op diet guidelines. Patient will see me again at 6 weeks post-op. At this 6 week visit, RD will assess how patient is tolerating soft protein and advance to vegetables, if tolerating soft protein without difficulty. Patient will also see RD again at 9 months post-op. This visit will assess patient's compliance with current protocol, including diet, vitamins, protein shakes, and exercise. Post-op diet guidelines will be reinforced. RD is available for questions and to meet with patient outside of the 6 week and 9 month post-op visit. We spent a lot of time talking about the vitamins. Patient understands the importance of being compliant with the diet protocol and the complications and risks that can occur if they are non-compliant with the nutritional protocol. Patient has attended at least one support group.     Candidate for surgery: Yes  Re-evaluation Date:     Procedure: Gastric Sleeve       Lila Nicole 87 RD  9/14/2022

## 2022-09-19 ENCOUNTER — OFFICE VISIT (OUTPATIENT)
Dept: SURGERY | Age: 27
End: 2022-09-19
Payer: MEDICAID

## 2022-09-19 VITALS — RESPIRATION RATE: 18 BRPM | BODY MASS INDEX: 47.09 KG/M2 | HEIGHT: 66 IN | TEMPERATURE: 98.9 F | WEIGHT: 293 LBS

## 2022-09-19 DIAGNOSIS — R00.0 TACHYCARDIA: ICD-10-CM

## 2022-09-19 DIAGNOSIS — K21.9 GASTROESOPHAGEAL REFLUX DISEASE, UNSPECIFIED WHETHER ESOPHAGITIS PRESENT: ICD-10-CM

## 2022-09-19 DIAGNOSIS — E66.01 MORBID OBESITY (HCC): Primary | ICD-10-CM

## 2022-09-19 DIAGNOSIS — E78.2 MIXED HYPERLIPIDEMIA: ICD-10-CM

## 2022-09-19 DIAGNOSIS — G47.33 OBSTRUCTIVE SLEEP APNEA SYNDROME: ICD-10-CM

## 2022-09-19 DIAGNOSIS — E28.2 PCOS (POLYCYSTIC OVARIAN SYNDROME): ICD-10-CM

## 2022-09-19 DIAGNOSIS — Z01.818 PREOP EXAMINATION: ICD-10-CM

## 2022-09-19 PROCEDURE — 99213 OFFICE O/P EST LOW 20 MIN: CPT | Performed by: SURGERY

## 2022-09-19 RX ORDER — OMEPRAZOLE 20 MG/1
20 CAPSULE, DELAYED RELEASE ORAL
COMMUNITY
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
## 2022-10-10 ENCOUNTER — DOCUMENTATION ONLY (OUTPATIENT)
Dept: BARIATRICS/WEIGHT MGMT | Age: 27
End: 2022-10-10

## 2022-10-10 NOTE — PROGRESS NOTES
CLINICAL NUTRITION PRE-OPERATIVE EDUCATION    Patient's Name: Joshua Marie   Age: 32 y.o. YOB: 1995   Sex: female    Education & Materials Provided:   - Soft Protein Diet Shopping List  -  Supplemental Resource Guide: MVI, B12, Calcium Citrate, Vitamin D, Vitamin B1,   and iron recommendations  - Protein Supplement Information  - Fluid Requirements/ No Straws  - No Caffeine or Carbonation   - No alcohol              - No Snacks or No Concentrated Sweets     - Exercising   - Support System at [facility] of Support Group meetings. Support System after surgery includes: x     - Key Diet Principles            - Addressed Current Habits/Changes to Make   - Patient has been educated on the liquid diet to begin 1 week prior to surgery. Patient understands the transition of the diet. Attendance of support group: Yes    Summary:  Patient has completed the required amount of visits with the Registered Dietitian. During these nutrition visits, we focused on dietary changes, behavior changes, and the importance of establishing an exercise routine. The diet protocol that patient was prescribed emphasized on low carbohydrates (less than 100 grams per day prior to surgery and 60-80 grams of protein per day). At today's session, patient was educated on the post-op diet protocol. Patient understands the importance of keeping total fat and sugar less than 3 grams per serving. Patient is aware of the transition of the diet stages and is aware that they will be on clear liquids for 7days, followed by soft protein for 5 weeks. Patient understands the body needs ~ 60-70 grams of protein per day. During the liquid phase, patient will need 60 grams of protein from shakes. Once eating soft protein, patient will only need 1 shake per day. Patient is aware of the importance of the vitamins and protein drinks. We spent a lot of time talking about the vitamins.   Patient understands the importance of being compliant with the diet protocol and the complications and risks that can occur if they are non-compliant with the nutritional protocol and non-compliant with the vitamins. Patient has also been educated on the pre-op liquid diet for 1 week. Patient understands that failure to comply in pre-op liquid diet could result in surgery being canceled. Patient's 6 week post-op nutrition appointment has been scheduled. At this 6 week visit, RD will assess how patient is tolerating soft protein and advance to vegetables, if tolerating soft protein without difficulty. Patient will also see RD again at 9 months post-op. This visit will assess patient's compliance with current protocol, including diet, vitamins, protein shakes, and exercise. Post-op diet guidelines will be reinforced. RD is available for questions and to meet with patient outside of the 6 week and 9 month post-op visit. Ok to proceed.      Candidate for surgery: Yes  Re-evaluation Date:     Lila Fischer [address] RD  10/10/2022

## 2022-10-11 ENCOUNTER — HOSPITAL ENCOUNTER (OUTPATIENT)
Dept: BARIATRICS/WEIGHT MGMT | Age: 27
Discharge: HOME | End: 2022-10-11

## 2022-10-14 ENCOUNTER — TELEPHONE (OUTPATIENT)
Dept: SURGERY | Age: 27
End: 2022-10-14

## 2022-10-14 NOTE — PERIOP NOTES
PRE-SURGICAL INSTRUCTIONS        Patient's Name:  Juan Luis Mcgee      XDKPD'U Date:  10/14/2022            Covid Testing Date and Time:    Surgery Date:  10/20/2022                Do NOT eat or drink anything, including candy, gum, or ice chips after midnight on 10/20, unless you have specific instructions from your surgeon or anesthesia provider to do so. You may brush your teeth before coming to the hospital.  No smoking 24 hours prior to the day of surgery. No alcohol 24 hours prior to the day of surgery. No recreational drugs for one week prior to the day of surgery. Leave all valuables, including money/purse, at home. Remove all jewelry, nail polish, acrylic nails, and makeup (including mascara); no lotions powders, deodorant, or perfume/cologne/after shave on the skin. Follow instruction for Hibiclens washes and CHG wipes from surgeon's office. Glasses/contact lenses and dentures may be worn to the hospital.  They will be removed prior to surgery. Call your doctor if symptoms of a cold or illness develop within 24-48 hours prior to your surgery. 11.  If you are having an outpatient procedure, please make arrangements for a responsible ADULT TO [address] [place] [address] and stay with you for 24 hours after your surgery. 12. ONE VISITOR in the hospital at this time for outpatient procedures. Exceptions may be made for surgical admissions, per nursing unit guidelines      Special Instructions:      Bring list of CURRENT medications. Bring oral device for MARK. Bring any pertinent legal medical records. Take these medications the morning of surgery with a sip of water:  NONE  Follow physician instructions about stopping anticoagulants. On the day of surgery, come in the main entrance of [facility]. Let the  at the desk know you are there for surgery. A staff member will come escort you to the surgical area on the second floor.     If you have any questions or concerns, please do not hesitate to call:     (Prior to the day of surgery) PAT department:  [phone]   (Day of surgery) Pre-Op department:  [phone]    These surgical instructions were reviewed with Valerie during the PAT phone call.

## 2022-10-14 NOTE — TELEPHONE ENCOUNTER
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 She reports she is using appropriate PPE but would like to limit as much exposure as possible to prevent infection before surgery which may delay her surgery date. Letter provided excusing from work from 10/15-10/19.

## 2022-10-19 ENCOUNTER — ANESTHESIA EVENT (OUTPATIENT)
Dept: SURGERY | Age: 27
DRG: 403 | End: 2022-10-19
Payer: MEDICAID

## 2022-10-20 ENCOUNTER — ANESTHESIA (OUTPATIENT)
Dept: SURGERY | Age: 27
DRG: 403 | End: 2022-10-20
Payer: MEDICAID

## 2022-10-20 ENCOUNTER — HOSPITAL ENCOUNTER (INPATIENT)
Age: 27
LOS: 1 days | Discharge: HOME | DRG: 403 | End: 2022-10-21
Attending: SURGERY | Admitting: SURGERY
Payer: MEDICAID

## 2022-10-20 DIAGNOSIS — G89.18 ACUTE POST-OPERATIVE PAIN: Primary | ICD-10-CM

## 2022-10-20 PROBLEM — E66.01 MORBID OBESITY DUE TO EXCESS CALORIES (HCC): Status: ACTIVE | Noted: 2022-10-20

## 2022-10-20 LAB — HCG UR QL: NEGATIVE

## 2022-10-20 PROCEDURE — 77030020829: Performed by: SURGERY

## 2022-10-20 PROCEDURE — 77030020053 HC ELECTRD LAPSCP COVD -B: Performed by: SURGERY

## 2022-10-20 PROCEDURE — 74011000250 HC RX REV CODE- 250: Performed by: SURGERY

## 2022-10-20 PROCEDURE — 88307 TISSUE EXAM BY PATHOLOGIST: CPT

## 2022-10-20 PROCEDURE — 74011000250 HC RX REV CODE- 250: Performed by: NURSE ANESTHETIST, CERTIFIED REGISTERED

## 2022-10-20 PROCEDURE — 47379 UNLISTED LAPS PX LIVER: CPT | Performed by: SURGERY

## 2022-10-20 PROCEDURE — 77030026438 HC STYL ET INTUB CARD -A: Performed by: ANESTHESIOLOGY

## 2022-10-20 PROCEDURE — 74011250637 HC RX REV CODE- 250/637: Performed by: SURGERY

## 2022-10-20 PROCEDURE — 2709999900 HC NON-CHARGEABLE SUPPLY: Performed by: SURGERY

## 2022-10-20 PROCEDURE — 77030041523 HC SEALNT FIBRN VITASEAL J&J -E: Performed by: SURGERY

## 2022-10-20 PROCEDURE — 77030034154 HC SHR COAG HARM ACE J&J -F: Performed by: SURGERY

## 2022-10-20 PROCEDURE — 74011250636 HC RX REV CODE- 250/636: Performed by: SURGERY

## 2022-10-20 PROCEDURE — 88313 SPECIAL STAINS GROUP 2: CPT

## 2022-10-20 PROCEDURE — 77030018875 HC APPL CLP LIG4 J&J -B: Performed by: SURGERY

## 2022-10-20 PROCEDURE — 77030012770 HC TRCR OPT FX AMR -B: Performed by: SURGERY

## 2022-10-20 PROCEDURE — 0FB24ZX EXCISION OF LEFT LOBE LIVER, PERCUTANEOUS ENDOSCOPIC APPROACH, DIAGNOSTIC: ICD-10-PCS | Performed by: SURGERY

## 2022-10-20 PROCEDURE — 76010000153 HC OR TIME 1.5 TO 2 HR: Performed by: SURGERY

## 2022-10-20 PROCEDURE — 77030036732 HC RELD STPLR VASC J&J -F: Performed by: SURGERY

## 2022-10-20 PROCEDURE — 77030019605: Performed by: SURGERY

## 2022-10-20 PROCEDURE — 77030008683 HC TU ET CUF COVD -A: Performed by: ANESTHESIOLOGY

## 2022-10-20 PROCEDURE — 77030018836 HC SOL IRR NACL ICUM -A: Performed by: SURGERY

## 2022-10-20 PROCEDURE — 77030027484 HC SUT ENDOSTCH COVD -B: Performed by: SURGERY

## 2022-10-20 PROCEDURE — 77030009968 HC RELD STPLR ENDOSC J&J -D: Performed by: SURGERY

## 2022-10-20 PROCEDURE — 74011250636 HC RX REV CODE- 250/636: Performed by: NURSE ANESTHETIST, CERTIFIED REGISTERED

## 2022-10-20 PROCEDURE — 77030009957 HC RELD ENDOSTCH COVD -C: Performed by: SURGERY

## 2022-10-20 PROCEDURE — 77030041460 HC APL VISTASEAL J&J -B: Performed by: SURGERY

## 2022-10-20 PROCEDURE — 77030008608 HC TRCR ENDOSC SMTH AMR -B: Performed by: SURGERY

## 2022-10-20 PROCEDURE — 77030008756 HC TU IRR SUC STRY -B: Performed by: SURGERY

## 2022-10-20 PROCEDURE — 77030003666 HC NDL SPINAL BD -A: Performed by: SURGERY

## 2022-10-20 PROCEDURE — 65270000029 HC RM PRIVATE

## 2022-10-20 PROCEDURE — 77030013079 HC BLNKT BAIR HGGR 3M -A: Performed by: ANESTHESIOLOGY

## 2022-10-20 PROCEDURE — 00797 ANES IPER UPR ABD GSTR PX MO: CPT | Performed by: ANESTHESIOLOGY

## 2022-10-20 PROCEDURE — 0D164ZA BYPASS STOMACH TO JEJUNUM, PERCUTANEOUS ENDOSCOPIC APPROACH: ICD-10-PCS | Performed by: SURGERY

## 2022-10-20 PROCEDURE — 77030040922 HC BLNKT HYPOTHRM STRY -A: Performed by: SURGERY

## 2022-10-20 PROCEDURE — 77030008437 HC REINF STRP REINF SEMGD WLGO -C: Performed by: SURGERY

## 2022-10-20 PROCEDURE — 77030030073 HC APPL ENDOSC CLP AMR -C: Performed by: SURGERY

## 2022-10-20 PROCEDURE — 76060000034 HC ANESTHESIA 1.5 TO 2 HR: Performed by: SURGERY

## 2022-10-20 PROCEDURE — 76210000017 HC OR PH I REC 1.5 TO 2 HR: Performed by: SURGERY

## 2022-10-20 PROCEDURE — 81025 URINE PREGNANCY TEST: CPT

## 2022-10-20 PROCEDURE — 77030002933 HC SUT MCRYL J&J -A: Performed by: SURGERY

## 2022-10-20 PROCEDURE — 43644 LAP GASTRIC BYPASS/ROUX-EN-Y: CPT | Performed by: SURGERY

## 2022-10-20 PROCEDURE — 77030010031 HC SCIS ENDOSC MPLR J&J -C: Performed by: SURGERY

## 2022-10-20 PROCEDURE — 77030016151 HC PROTCTR LNS DFOG COVD -B: Performed by: SURGERY

## 2022-10-20 PROCEDURE — 77030040361 HC SLV COMPR DVT MDII -B: Performed by: SURGERY

## 2022-10-20 PROCEDURE — C9290 INJ, BUPIVACAINE LIPOSOME: HCPCS | Performed by: SURGERY

## 2022-10-20 PROCEDURE — 77030010507 HC ADH SKN DERMBND J&J -B: Performed by: SURGERY

## 2022-10-20 PROCEDURE — 00797 ANES IPER UPR ABD GSTR PX MO: CPT | Performed by: NURSE ANESTHETIST, CERTIFIED REGISTERED

## 2022-10-20 RX ORDER — FENTANYL CITRATE 50 UG/ML
INJECTION, SOLUTION INTRAMUSCULAR; INTRAVENOUS AS NEEDED
Status: DISCONTINUED | OUTPATIENT
Start: 2022-10-20 | End: 2022-10-20 | Stop reason: HOSPADM

## 2022-10-20 RX ORDER — DIPHENHYDRAMINE HYDROCHLORIDE 50 MG/ML
12.5 INJECTION, SOLUTION INTRAMUSCULAR; INTRAVENOUS
Status: DISCONTINUED | OUTPATIENT
Start: 2022-10-20 | End: 2022-10-20 | Stop reason: HOSPADM

## 2022-10-20 RX ORDER — LIDOCAINE HYDROCHLORIDE 20 MG/ML
INJECTION, SOLUTION EPIDURAL; INFILTRATION; INTRACAUDAL; PERINEURAL AS NEEDED
Status: DISCONTINUED | OUTPATIENT
Start: 2022-10-20 | End: 2022-10-20 | Stop reason: HOSPADM

## 2022-10-20 RX ORDER — NALOXONE HYDROCHLORIDE 0.4 MG/ML
0.04 INJECTION, SOLUTION INTRAMUSCULAR; INTRAVENOUS; SUBCUTANEOUS AS NEEDED
Status: DISCONTINUED | OUTPATIENT
Start: 2022-10-20 | End: 2022-10-20 | Stop reason: HOSPADM

## 2022-10-20 RX ORDER — HYDROMORPHONE HYDROCHLORIDE 2 MG/ML
INJECTION, SOLUTION INTRAMUSCULAR; INTRAVENOUS; SUBCUTANEOUS AS NEEDED
Status: DISCONTINUED | OUTPATIENT
Start: 2022-10-20 | End: 2022-10-20 | Stop reason: HOSPADM

## 2022-10-20 RX ORDER — PHENYLEPHRINE HCL IN 0.9% NACL 1 MG/10 ML
SYRINGE (ML) INTRAVENOUS AS NEEDED
Status: DISCONTINUED | OUTPATIENT
Start: 2022-10-20 | End: 2022-10-20 | Stop reason: HOSPADM

## 2022-10-20 RX ORDER — ROCURONIUM BROMIDE 10 MG/ML
INJECTION, SOLUTION INTRAVENOUS AS NEEDED
Status: DISCONTINUED | OUTPATIENT
Start: 2022-10-20 | End: 2022-10-20 | Stop reason: HOSPADM

## 2022-10-20 RX ORDER — BUPIVACAINE HYDROCHLORIDE 5 MG/ML
INJECTION, SOLUTION EPIDURAL; INTRACAUDAL AS NEEDED
Status: DISCONTINUED | OUTPATIENT
Start: 2022-10-20 | End: 2022-10-20 | Stop reason: HOSPADM

## 2022-10-20 RX ORDER — SUCCINYLCHOLINE CHLORIDE 20 MG/ML
INJECTION INTRAMUSCULAR; INTRAVENOUS AS NEEDED
Status: DISCONTINUED | OUTPATIENT
Start: 2022-10-20 | End: 2022-10-20 | Stop reason: HOSPADM

## 2022-10-20 RX ORDER — ONDANSETRON 2 MG/ML
4 INJECTION INTRAMUSCULAR; INTRAVENOUS ONCE
Status: COMPLETED | OUTPATIENT
Start: 2022-10-20 | End: 2022-10-20

## 2022-10-20 RX ORDER — OXYCODONE HYDROCHLORIDE 5 MG/1
5 TABLET ORAL
Status: DISCONTINUED | OUTPATIENT
Start: 2022-10-20 | End: 2022-10-21 | Stop reason: HOSPADM

## 2022-10-20 RX ORDER — ONDANSETRON 2 MG/ML
4 INJECTION INTRAMUSCULAR; INTRAVENOUS
Status: DISCONTINUED | OUTPATIENT
Start: 2022-10-20 | End: 2022-10-21 | Stop reason: HOSPADM

## 2022-10-20 RX ORDER — PROPOFOL 10 MG/ML
INJECTION, EMULSION INTRAVENOUS AS NEEDED
Status: DISCONTINUED | OUTPATIENT
Start: 2022-10-20 | End: 2022-10-20 | Stop reason: HOSPADM

## 2022-10-20 RX ORDER — HYDROMORPHONE HYDROCHLORIDE 1 MG/ML
1 INJECTION, SOLUTION INTRAMUSCULAR; INTRAVENOUS; SUBCUTANEOUS
Status: DISCONTINUED | OUTPATIENT
Start: 2022-10-20 | End: 2022-10-21 | Stop reason: HOSPADM

## 2022-10-20 RX ORDER — KETOROLAC TROMETHAMINE 15 MG/ML
15 INJECTION, SOLUTION INTRAMUSCULAR; INTRAVENOUS
Status: DISCONTINUED | OUTPATIENT
Start: 2022-10-20 | End: 2022-10-21 | Stop reason: HOSPADM

## 2022-10-20 RX ORDER — DEXAMETHASONE SODIUM PHOSPHATE 4 MG/ML
INJECTION, SOLUTION INTRA-ARTICULAR; INTRALESIONAL; INTRAMUSCULAR; INTRAVENOUS; SOFT TISSUE AS NEEDED
Status: DISCONTINUED | OUTPATIENT
Start: 2022-10-20 | End: 2022-10-20 | Stop reason: HOSPADM

## 2022-10-20 RX ORDER — GLYCOPYRROLATE 0.2 MG/ML
INJECTION INTRAMUSCULAR; INTRAVENOUS AS NEEDED
Status: DISCONTINUED | OUTPATIENT
Start: 2022-10-20 | End: 2022-10-20 | Stop reason: HOSPADM

## 2022-10-20 RX ORDER — NALOXONE HYDROCHLORIDE 0.4 MG/ML
0.4 INJECTION, SOLUTION INTRAMUSCULAR; INTRAVENOUS; SUBCUTANEOUS AS NEEDED
Status: DISCONTINUED | OUTPATIENT
Start: 2022-10-20 | End: 2022-10-21 | Stop reason: HOSPADM

## 2022-10-20 RX ORDER — HYOSCYAMINE SULFATE 0.12 MG/1
0.12 TABLET SUBLINGUAL
Status: DISCONTINUED | OUTPATIENT
Start: 2022-10-20 | End: 2022-10-21 | Stop reason: HOSPADM

## 2022-10-20 RX ORDER — SODIUM CHLORIDE, SODIUM LACTATE, POTASSIUM CHLORIDE, CALCIUM CHLORIDE 600; 310; 30; 20 MG/100ML; MG/100ML; MG/100ML; MG/100ML
50 INJECTION, SOLUTION INTRAVENOUS CONTINUOUS
Status: DISCONTINUED | OUTPATIENT
Start: 2022-10-20 | End: 2022-10-20 | Stop reason: HOSPADM

## 2022-10-20 RX ORDER — SODIUM CHLORIDE, SODIUM LACTATE, POTASSIUM CHLORIDE, CALCIUM CHLORIDE 600; 310; 30; 20 MG/100ML; MG/100ML; MG/100ML; MG/100ML
25 INJECTION, SOLUTION INTRAVENOUS CONTINUOUS
Status: DISCONTINUED | OUTPATIENT
Start: 2022-10-20 | End: 2022-10-20 | Stop reason: HOSPADM

## 2022-10-20 RX ORDER — ENOXAPARIN SODIUM 100 MG/ML
40 INJECTION SUBCUTANEOUS ONCE
Status: COMPLETED | OUTPATIENT
Start: 2022-10-20 | End: 2022-10-20

## 2022-10-20 RX ORDER — DIPHENHYDRAMINE HYDROCHLORIDE 50 MG/ML
25 INJECTION, SOLUTION INTRAMUSCULAR; INTRAVENOUS
Status: DISCONTINUED | OUTPATIENT
Start: 2022-10-20 | End: 2022-10-21 | Stop reason: HOSPADM

## 2022-10-20 RX ORDER — FENTANYL CITRATE 50 UG/ML
50 INJECTION, SOLUTION INTRAMUSCULAR; INTRAVENOUS
Status: DISCONTINUED | OUTPATIENT
Start: 2022-10-20 | End: 2022-10-20 | Stop reason: HOSPADM

## 2022-10-20 RX ORDER — ALBUTEROL SULFATE 0.83 MG/ML
2.5 SOLUTION RESPIRATORY (INHALATION) AS NEEDED
Status: DISCONTINUED | OUTPATIENT
Start: 2022-10-20 | End: 2022-10-20 | Stop reason: HOSPADM

## 2022-10-20 RX ORDER — HYDROMORPHONE HYDROCHLORIDE 1 MG/ML
0.5 INJECTION, SOLUTION INTRAMUSCULAR; INTRAVENOUS; SUBCUTANEOUS AS NEEDED
Status: DISCONTINUED | OUTPATIENT
Start: 2022-10-20 | End: 2022-10-20 | Stop reason: HOSPADM

## 2022-10-20 RX ORDER — MIDAZOLAM HYDROCHLORIDE 1 MG/ML
INJECTION, SOLUTION INTRAMUSCULAR; INTRAVENOUS AS NEEDED
Status: DISCONTINUED | OUTPATIENT
Start: 2022-10-20 | End: 2022-10-20 | Stop reason: HOSPADM

## 2022-10-20 RX ORDER — SCOLOPAMINE TRANSDERMAL SYSTEM 1 MG/1
1 PATCH, EXTENDED RELEASE TRANSDERMAL ONCE
Status: DISCONTINUED | OUTPATIENT
Start: 2022-10-20 | End: 2022-10-21 | Stop reason: HOSPADM

## 2022-10-20 RX ORDER — APREPITANT 40 MG/1
40 CAPSULE ORAL ONCE
Status: COMPLETED | OUTPATIENT
Start: 2022-10-20 | End: 2022-10-20

## 2022-10-20 RX ORDER — ACETAMINOPHEN 325 MG/1
650 TABLET ORAL EVERY 6 HOURS
Status: DISCONTINUED | OUTPATIENT
Start: 2022-10-20 | End: 2022-10-21 | Stop reason: HOSPADM

## 2022-10-20 RX ORDER — LIDOCAINE HYDROCHLORIDE 10 MG/ML
0.1 INJECTION, SOLUTION EPIDURAL; INFILTRATION; INTRACAUDAL; PERINEURAL AS NEEDED
Status: DISCONTINUED | OUTPATIENT
Start: 2022-10-20 | End: 2022-10-20 | Stop reason: HOSPADM

## 2022-10-20 RX ORDER — ENOXAPARIN SODIUM 100 MG/ML
40 INJECTION SUBCUTANEOUS EVERY 24 HOURS
Status: DISCONTINUED | OUTPATIENT
Start: 2022-10-21 | End: 2022-10-21 | Stop reason: HOSPADM

## 2022-10-20 RX ORDER — NEOSTIGMINE METHYLSULFATE 1 MG/ML
INJECTION, SOLUTION INTRAVENOUS AS NEEDED
Status: DISCONTINUED | OUTPATIENT
Start: 2022-10-20 | End: 2022-10-20 | Stop reason: HOSPADM

## 2022-10-20 RX ORDER — ONDANSETRON 2 MG/ML
INJECTION INTRAMUSCULAR; INTRAVENOUS AS NEEDED
Status: DISCONTINUED | OUTPATIENT
Start: 2022-10-20 | End: 2022-10-20 | Stop reason: HOSPADM

## 2022-10-20 RX ORDER — INSULIN LISPRO 100 [IU]/ML
INJECTION, SOLUTION INTRAVENOUS; SUBCUTANEOUS ONCE
Status: DISCONTINUED | OUTPATIENT
Start: 2022-10-20 | End: 2022-10-20 | Stop reason: HOSPADM

## 2022-10-20 RX ORDER — SODIUM CHLORIDE, SODIUM LACTATE, POTASSIUM CHLORIDE, CALCIUM CHLORIDE 600; 310; 30; 20 MG/100ML; MG/100ML; MG/100ML; MG/100ML
125 INJECTION, SOLUTION INTRAVENOUS CONTINUOUS
Status: DISCONTINUED | OUTPATIENT
Start: 2022-10-20 | End: 2022-10-21 | Stop reason: HOSPADM

## 2022-10-20 RX ADMIN — APREPITANT 40 MG: 40 CAPSULE ORAL at 10:40

## 2022-10-20 RX ADMIN — NEOSTIGMINE METHYLSULFATE 5 MG: 1 INJECTION INTRAVENOUS at 14:25

## 2022-10-20 RX ADMIN — PROPOFOL 150 MG: 10 INJECTION, EMULSION INTRAVENOUS at 12:43

## 2022-10-20 RX ADMIN — OXYCODONE HYDROCHLORIDE 5 MG: 5 TABLET ORAL at 23:53

## 2022-10-20 RX ADMIN — GLYCOPYRROLATE 0.6 MG: 0.2 INJECTION INTRAMUSCULAR; INTRAVENOUS at 14:25

## 2022-10-20 RX ADMIN — FENTANYL CITRATE 100 MCG: 50 INJECTION, SOLUTION INTRAMUSCULAR; INTRAVENOUS at 12:43

## 2022-10-20 RX ADMIN — Medication 200 MCG: at 13:18

## 2022-10-20 RX ADMIN — HYDROMORPHONE HYDROCHLORIDE 0.2 MG: 2 INJECTION, SOLUTION INTRAMUSCULAR; INTRAVENOUS; SUBCUTANEOUS at 14:17

## 2022-10-20 RX ADMIN — ENOXAPARIN SODIUM 40 MG: 100 INJECTION SUBCUTANEOUS at 10:40

## 2022-10-20 RX ADMIN — OXYCODONE HYDROCHLORIDE 5 MG: 5 TABLET ORAL at 18:46

## 2022-10-20 RX ADMIN — SODIUM CHLORIDE, POTASSIUM CHLORIDE, SODIUM LACTATE AND CALCIUM CHLORIDE 125 ML/HR: 600; 310; 30; 20 INJECTION, SOLUTION INTRAVENOUS at 18:44

## 2022-10-20 RX ADMIN — ROCURONIUM BROMIDE 5 MG: 50 INJECTION INTRAVENOUS at 12:43

## 2022-10-20 RX ADMIN — ROCURONIUM BROMIDE 10 MG: 50 INJECTION INTRAVENOUS at 13:30

## 2022-10-20 RX ADMIN — SUCCINYLCHOLINE CHLORIDE 100 MG: 20 INJECTION, SOLUTION INTRAMUSCULAR; INTRAVENOUS at 12:43

## 2022-10-20 RX ADMIN — GLYCOPYRROLATE 0.2 MG: 0.2 INJECTION INTRAMUSCULAR; INTRAVENOUS at 12:37

## 2022-10-20 RX ADMIN — FENTANYL CITRATE 50 MCG: 50 INJECTION, SOLUTION INTRAMUSCULAR; INTRAVENOUS at 12:59

## 2022-10-20 RX ADMIN — ONDANSETRON 4 MG: 2 INJECTION INTRAMUSCULAR; INTRAVENOUS at 12:50

## 2022-10-20 RX ADMIN — FAMOTIDINE 20 MG: 10 INJECTION INTRAVENOUS at 10:40

## 2022-10-20 RX ADMIN — HYDROMORPHONE HYDROCHLORIDE 0.2 MG: 2 INJECTION, SOLUTION INTRAMUSCULAR; INTRAVENOUS; SUBCUTANEOUS at 14:21

## 2022-10-20 RX ADMIN — CEFAZOLIN SODIUM 3 G: 1 INJECTION, POWDER, FOR SOLUTION INTRAMUSCULAR; INTRAVENOUS at 12:50

## 2022-10-20 RX ADMIN — SODIUM CHLORIDE, SODIUM LACTATE, POTASSIUM CHLORIDE, AND CALCIUM CHLORIDE 25 ML/HR: 600; 310; 30; 20 INJECTION, SOLUTION INTRAVENOUS at 10:41

## 2022-10-20 RX ADMIN — HYOSCYAMINE SULFATE 0.12 MG: 0.12 TABLET ORAL; SUBLINGUAL at 21:29

## 2022-10-20 RX ADMIN — ACETAMINOPHEN 650 MG: 325 SUPPOSITORY RECTAL at 12:35

## 2022-10-20 RX ADMIN — Medication 200 MCG: at 13:37

## 2022-10-20 RX ADMIN — LIDOCAINE HYDROCHLORIDE 100 MG: 20 INJECTION, SOLUTION EPIDURAL; INFILTRATION; INTRACAUDAL; PERINEURAL at 12:43

## 2022-10-20 RX ADMIN — SODIUM CHLORIDE, SODIUM LACTATE, POTASSIUM CHLORIDE, AND CALCIUM CHLORIDE: 600; 310; 30; 20 INJECTION, SOLUTION INTRAVENOUS at 12:37

## 2022-10-20 RX ADMIN — ONDANSETRON 4 MG: 2 INJECTION INTRAMUSCULAR; INTRAVENOUS at 15:24

## 2022-10-20 RX ADMIN — MIDAZOLAM HYDROCHLORIDE 2 MG: 2 INJECTION, SOLUTION INTRAMUSCULAR; INTRAVENOUS at 12:37

## 2022-10-20 RX ADMIN — ROCURONIUM BROMIDE 45 MG: 50 INJECTION INTRAVENOUS at 12:50

## 2022-10-20 RX ADMIN — FENTANYL CITRATE 50 MCG: 50 INJECTION, SOLUTION INTRAMUSCULAR; INTRAVENOUS at 15:29

## 2022-10-20 RX ADMIN — ONDANSETRON 4 MG: 2 INJECTION INTRAMUSCULAR; INTRAVENOUS at 14:25

## 2022-10-20 RX ADMIN — ACETAMINOPHEN 650 MG: 325 TABLET, FILM COATED ORAL at 18:41

## 2022-10-20 RX ADMIN — DEXAMETHASONE SODIUM PHOSPHATE 4 MG: 4 INJECTION, SOLUTION INTRAMUSCULAR; INTRAVENOUS at 12:50

## 2022-10-20 NOTE — PERIOP NOTES
1615 TRANSFER - OUT REPORT:    Verbal report given to [facility] RN(name) on [employer]  being transferred to [facility](unit) for routine post - op       Report consisted of patients Situation, Background, Assessment and   Recommendations(SBAR). Information from the following report(s) SBAR, OR Summary, Procedure Summary, MAR, Recent Results, and Cardiac Rhythm SR  was reviewed with the receiving nurse. Lines:   Peripheral IV 10/20/22 Posterior;Right Forearm (Active)   Site Assessment Clean, dry, & intact 10/20/22 1443   Phlebitis Assessment 0 10/20/22 1443   Infiltration Assessment 0 10/20/22 1443   Dressing Status Clean, dry, & intact 10/20/22 1443   Dressing Type Tape;Transparent 10/20/22 1443   Hub Color/Line Status Pink;Patent 10/20/22 1443        Opportunity for questions and clarification was provided.       Patient transported with:   [employer]

## 2022-10-20 NOTE — OP NOTES
Operative Report    Patient: Sergio Castellanos MRN: [id number]  SSN: [id number]    YOB: 1995  Age: 32 y.o. Sex: female       Date of Surgery: 10/20/2022     Preoperative Diagnosis: Morbid obesity [address] [E66.01]  Gastroesophageal reflux disease, unspecified whether esophagitis present [K21.9]  Sleep apnea, unspecified type [G47.30]     Postoperative Diagnosis: Morbid [facility] ([facility] [E66.01]  Gastroesophageal reflux disease, unspecified whether esophagitis present [K21.9]  Sleep apnea, unspecified type [G47.30]   NAFLD  Hepatomegaly    Surgeon(s) and Role:     * Danica Fleming MD - Primary    Anesthesia: General     Procedure:   Laparoscopic bobo-en-Y gastric bypass  Laparoscopic wedge biopsy of the left lobe of the liver    Procedure in Detail: Sergio Castellanos was identified in the pre-operative holding area. Informed consent was obtained after a complete discussion of risks, benefits and alternatives to surgery were had with the patient. The patient was brought back to the operating room and placed under general endotracheal anesthesia in the supine position on the operating room table. SCDs were applied. Preop VTE prophylaxis as well as all other multimodal analgesia, GI prophylaxis, etc were verified. The patient was then prepped and draped in the usual sterile fashion after being appropriately padded and secured to the table. A timeout was performed verifying patient identity, planned procedure, medications, and all other pertinent aspects of the case. An incision was made at Alan's point and a Verress needle was introduced into the peritoneal cavity. Saline drop test showed no blood on aspiration and free flow of saline into the peritoneal cavity. The peritoneal cavity was insufflated to 15mmHg without incident. A 5mm optical trocar was introduced, visualizing the layers of the abdominal wall on entry. No evidence of visceral injury was noted from trocar or verress needle placement. A 12mm trocar was placed above and to the left of the umbilicus, another above and to the right of the umbilicus, and a 5mm trocar placed in the right upper quadrant, all under vision of the laparoscope. A JORGE block was performed under laparoscopic visualization bilaterally. We began by removing the omentum from the pelvis and placing up over the colon. We lifted the colonic mesentery and clearly identified the Ligament of Treitz. We then ran counted down 75-100cm on the jejunum. We used a 60 mm white stapler load to divide the jejunum to create the BP limb. We then use the ultrasonic cele to divide the small bowel mesentery. Next, as the assistant held the BP limb for orientation, I ran 125 cm of jejunum to create the Edilma limb. We created enterotomies with the ultrasonic cele in the BP limb and JJ portion of the Edilma limb. After confirming orientation and anatomy, we used a 60 mm white load to create a stapled JJ anastomosis. I then used absorbable suture in running fashion to close the common enterotomy followed by a 2-0 permanent suture to close the JJ mesenteric defect. We then divided the greater omentum with the ultrasonic cele to decrease future tension on the Edilma limb. We then made an incision in the epigastrium and inserted the [facility] retractor. There was hepatomegaly and morphologic appearance of NAFLD, so a wedge biopsy of the left lobe of the liver was obtained sharply and hemostasis was achieved with cautery. The left lobe of liver was elevated. Inspection of the hiatus did not demonstrate a hiatal hernia. We then used the harmonic and blunt dissection to take down the angle of His. We identified the GE junction. We then counted approximately 6-7 cm distal to this. I then used a combination of energy and blunt dissection to create a perigastric window into the lesser sac. I then used a 60 mm blue load to make a transverse staple line.  The anesthesiologist/anesthetist then placed a 39 Latvian calibration tube down the patient's esophagus into the pouch for a guide. I used multiple loads laterally to finish the pouch using the calibration tube as a sizing guide. We ensured hemostasis. The bobo limb was brought up in an antecolic/antegastric orientation to the gastric pouch. A two-layer sewn gastrojejunal anastomosis was performed using absorbable suture and the 36F calibration tube to size the anastomotic diameter. We then placed a clamp on the bowel and performed an intraoperative leak test.     Intraoperative leak test did not demonstrate a leak. The anastomosis was patent. We suctioned out the remaining free fluid in the belly. We placed a 2-0 vicryl suture at the 12 mm trocar site. We removed the [facility] retractor. We removed the trocars under direct visualization. The dermis was closed with 4-0 Monocryl followed by Dermabond for the skin. At the end of the procedure all instrument, needle, and sponge counts were correct. I was present and scrubbed throughout the entirety of the case. The patient awoke from anesthesia and was extubated without complication and sent to PACU in stable condition. Estimated Blood Loss:  minimal    Tourniquet Time: * No tourniquets in log *      Implants: * No implants in log *            Specimens:   ID Type Source Tests Collected by Time Destination   1 : liver biopsy Preservative   Danica Fleming MD 10/20/2022 1420 Pathology           Drains: None                Complications: None    Counts: Sponge and needle counts were correct times two.     Signed By:  Vahe Petit MD     October 20, 2022

## 2022-10-20 NOTE — BRIEF OP NOTE
Brief Postoperative Note    Patient: Allison Garcia  YOB: 1995  MRN: [id number]    Date of Procedure: 10/20/2022     Pre-Op Diagnosis: [facility] [E66.01]  Gastroesophageal reflux disease, unspecified whether esophagitis present [K21.9]  Sleep apnea, unspecified type [G47.30]    Post-Op Diagnosis:   Morbid obesity ([facility] [E66.01]  Gastroesophageal reflux disease, unspecified whether esophagitis present [K21.9]  Sleep apnea, unspecified type [G47.30]  NAFLD  Hepatomegaly    Procedure(s):  LAPAROSCOPIC BETH-EN-Y GASTRIC BYPASS WITH LIVER WEDGE BIOPSY    Surgeon(s):  Gerda Ray MD    Surgical Assistant: Surg Asst-1: Harini Staley    Anesthesia: General     Estimated Blood Loss (mL): Minimal    Complications: None    Specimens:   ID Type Source Tests Collected by Time Destination   1 : liver biopsy Preservative   Gerda Ray MD 10/20/2022 1420 Pathology        Implants: * No implants in log *    Drains:   [REMOVED] Orogastric Tube 10/20/22 (Removed)       Findings: uneventful LRYGB, 120/90, sewn, liver biopsy for NAFLD    Electronically Signed by Ilan Mnedoza MD on 10/20/2022 at 2:25 PM

## 2022-10-20 NOTE — ANESTHESIA PREPROCEDURE EVALUATION
Relevant Problems   RESPIRATORY SYSTEM   (+) Obstructive sleep apnea syndrome      NEUROLOGY   (+) Depression      GASTROINTESTINAL   (+) GERD (gastroesophageal reflux disease)   (+) Gastroesophageal reflux disease      ENDOCRINE   (+) Morbid obesity (HCC)       Anesthetic History   No history of anesthetic complications            Review of Systems / Medical History  Patient summary reviewed and pertinent labs reviewed    Pulmonary        Sleep apnea: No treatment           Neuro/Psych         Psychiatric history     Cardiovascular  Within defined limits                     GI/Hepatic/Renal     GERD: well controlled           Endo/Other        Morbid obesity     Other Findings              Physical Exam    Airway  Mallampati: II  TM Distance: 4 - 6 cm  Neck ROM: normal range of motion   Mouth opening: Normal     Cardiovascular               Dental  No notable dental hx       Pulmonary                 Abdominal  GI exam deferred       Other Findings            Anesthetic Plan    ASA: 3  Anesthesia type: general          Induction: Intravenous  Anesthetic plan and risks discussed with: Patient

## 2022-10-20 NOTE — ANESTHESIA POSTPROCEDURE EVALUATION
Procedure(s):  LAPAROSCOPIC BETH-EN-Y GASTRIC BYPASS WITH  LIVER WEDGE BIOPSY. general    Anesthesia Post Evaluation      Multimodal analgesia: multimodal analgesia used between 6 hours prior to anesthesia start to PACU discharge  Patient location during evaluation: PACU  Patient participation: complete - patient participated  Level of consciousness: sleepy but conscious  Pain management: adequate  Airway patency: patent  Anesthetic complications: no  Cardiovascular status: acceptable  Respiratory status: acceptable  Hydration status: acceptable  Post anesthesia nausea and vomiting:  controlled  Final Post Anesthesia Temperature Assessment:  Normothermia (36.0-37.5 degrees C)      INITIAL Post-op Vital signs:   Vitals Value Taken Time   /67 10/20/22 1510   Temp 36.1 °C (97 °F) 10/20/22 1443   Pulse 87 10/20/22 1512   Resp 21 10/20/22 1512   SpO2 93 % 10/20/22 1512   Vitals shown include unvalidated device data.
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
## 2022-10-21 VITALS
HEART RATE: 94 BPM | BODY MASS INDEX: 47.09 KG/M2 | OXYGEN SATURATION: 93 % | TEMPERATURE: 99.1 F | HEIGHT: 66 IN | SYSTOLIC BLOOD PRESSURE: 107 MMHG | RESPIRATION RATE: 18 BRPM | DIASTOLIC BLOOD PRESSURE: 70 MMHG | WEIGHT: 293 LBS

## 2022-10-21 LAB
ERYTHROCYTE [DISTWIDTH] IN BLOOD BY AUTOMATED COUNT: 15.9 % (ref 11.6–14.5)
HCT VFR BLD AUTO: 36.6 % (ref 35–45)
HGB BLD-MCNC: 11.4 G/DL (ref 12–16)
MCH RBC QN AUTO: 23 PG (ref 24–34)
MCHC RBC AUTO-ENTMCNC: 31.1 G/DL (ref 31–37)
MCV RBC AUTO: 73.9 FL (ref 78–100)
NRBC # BLD: 0 K/UL (ref 0–0.01)
NRBC BLD-RTO: 0 PER 100 WBC
PLATELET # BLD AUTO: 315 K/UL (ref 135–420)
PMV BLD AUTO: 9.8 FL (ref 9.2–11.8)
RBC # BLD AUTO: 4.95 M/UL (ref 4.2–5.3)
WBC # BLD AUTO: 12.8 K/UL (ref 4.6–13.2)

## 2022-10-21 PROCEDURE — 74011250636 HC RX REV CODE- 250/636: Performed by: SURGERY

## 2022-10-21 PROCEDURE — 85027 COMPLETE CBC AUTOMATED: CPT

## 2022-10-21 PROCEDURE — 36415 COLL VENOUS BLD VENIPUNCTURE: CPT

## 2022-10-21 PROCEDURE — C9113 INJ PANTOPRAZOLE SODIUM, VIA: HCPCS | Performed by: SURGERY

## 2022-10-21 PROCEDURE — 74011250637 HC RX REV CODE- 250/637: Performed by: SURGERY

## 2022-10-21 PROCEDURE — 74011000250 HC RX REV CODE- 250: Performed by: SURGERY

## 2022-10-21 RX ORDER — OXYCODONE HYDROCHLORIDE 5 MG/1
5 TABLET ORAL
Qty: 10 TABLET | Refills: 0 | Status: SHIPPED | OUTPATIENT
Start: 2022-10-21 | End: 2022-10-24

## 2022-10-21 RX ORDER — ONDANSETRON 4 MG/1
4 TABLET, ORALLY DISINTEGRATING ORAL
Qty: 15 TABLET | Refills: 0 | Status: SHIPPED | OUTPATIENT
Start: 2022-10-21 | End: 2022-11-07 | Stop reason: ALTCHOICE

## 2022-10-21 RX ADMIN — ACETAMINOPHEN 650 MG: 325 TABLET, FILM COATED ORAL at 10:26

## 2022-10-21 RX ADMIN — ENOXAPARIN SODIUM 40 MG: 100 INJECTION SUBCUTANEOUS at 10:23

## 2022-10-21 RX ADMIN — SODIUM CHLORIDE 40 MG: 9 INJECTION, SOLUTION INTRAMUSCULAR; INTRAVENOUS; SUBCUTANEOUS at 10:22

## 2022-10-21 RX ADMIN — HYOSCYAMINE SULFATE 0.12 MG: 0.12 TABLET ORAL; SUBLINGUAL at 03:25

## 2022-10-21 RX ADMIN — KETOROLAC TROMETHAMINE 15 MG: 15 INJECTION, SOLUTION INTRAMUSCULAR; INTRAVENOUS at 04:58

## 2022-10-21 NOTE — PROGRESS NOTES
Problem: Pain  Goal: *Control of Pain  Outcome: Progressing Towards Goal     Problem: Patient Education: Go to Patient Education Activity  Goal: Patient/Family Education  Outcome: Progressing Towards Goal     Problem: Falls - Risk of  Goal: *Absence of Falls  Description: Document Rhonda Ohara Fall Risk and appropriate interventions in the flowsheet.   Outcome: Progressing Towards Goal  Note: Fall Risk Interventions:  Mobility Interventions: Patient to call before getting OOB         Medication Interventions: Patient to call before getting OOB    Elimination Interventions: Call light in reach              Problem: Patient Education: Go to Patient Education Activity  Goal: Patient/Family Education  Outcome: Progressing Towards Goal     Problem: Patient Education: Go to Patient Education Activity  Goal: Patient/Family Education  Outcome: Progressing Towards Goal     Problem: Laparoscopic Gastric Bypass:Day of Surgery  Goal: Off Pathway (Use only if patient is Off Pathway)  Outcome: Resolved/Met  Goal: Activity/Safety  Outcome: Resolved/Met  Goal: Consults, if ordered  Outcome: Resolved/Met  Goal: Diagnostic Test/Procedures  Outcome: Resolved/Met  Goal: Nutrition/Diet  Outcome: Resolved/Met  Goal: Medications  Outcome: Resolved/Met  Goal: Respiratory  Outcome: Resolved/Met  Goal: Treatments/Interventions/Procedures  Outcome: Resolved/Met  Goal: Psychosocial  Outcome: Resolved/Met  Goal: *No signs and symptoms of infection or wound complications  Outcome: Resolved/Met  Goal: *Optimal pain control at patient's stated goal  Outcome: Resolved/Met  Goal: *Adequate urinary output (equal to or greater than 30 milliliters/hour)  Outcome: Resolved/Met  Goal: *Hemodynamically stable  Outcome: Resolved/Met  Goal: *Tolerating diet  Outcome: Resolved/Met  Goal: *Demonstrates progressive activity  Outcome: Resolved/Met  Goal: *Absence of signs and symptoms of DVT  Outcome: Resolved/Met  Goal: *Labs within defined limits  Outcome: Resolved/Met  Goal: *Oxygen saturation within defined limits  Outcome: Resolved/Met     Problem: Laparoscopic Gastric Bypass:Day of Surgery  Goal: Off Pathway (Use only if patient is Off Pathway)  Outcome: Resolved/Met  Goal: Activity/Safety  Outcome: Resolved/Met  Goal: Consults, if ordered  Outcome: Resolved/Met  Goal: Diagnostic Test/Procedures  Outcome: Resolved/Met  Goal: Nutrition/Diet  Outcome: Resolved/Met  Goal: Medications  Outcome: Resolved/Met  Goal: Respiratory  Outcome: Resolved/Met  Goal: Treatments/Interventions/Procedures  Outcome: Resolved/Met  Goal: Psychosocial  Outcome: Resolved/Met  Goal: *No signs and symptoms of infection or wound complications  Outcome: Resolved/Met  Goal: *Optimal pain control at patient's stated goal  Outcome: Resolved/Met  Goal: *Adequate urinary output (equal to or greater than 30 milliliters/hour)  Outcome: Resolved/Met  Goal: *Hemodynamically stable  Outcome: Resolved/Met  Goal: *Tolerating diet  Outcome: Resolved/Met  Goal: *Demonstrates progressive activity  Outcome: Resolved/Met  Goal: *Absence of signs and symptoms of DVT  Outcome: Resolved/Met  Goal: *Labs within defined limits  Outcome: Resolved/Met  Goal: *Oxygen saturation within defined limits  Outcome: Resolved/Met

## 2022-10-21 NOTE — PROGRESS NOTES
Problem: Pain  Goal: *Control of Pain  Outcome: Progressing Towards Goal     Problem: Patient Education: Go to Patient Education Activity  Goal: Patient/Family Education  Outcome: Progressing Towards Goal     Problem: Falls - Risk of  Goal: *Absence of Falls  Description: Document Antoni Fail Fall Risk and appropriate interventions in the flowsheet.   Outcome: Progressing Towards Goal  Note: Fall Risk Interventions:  Mobility Interventions: Patient to call before getting OOB         Medication Interventions: Patient to call before getting OOB    Elimination Interventions: Call light in reach              Problem: Patient Education: Go to Patient Education Activity  Goal: Patient/Family Education  Outcome: Progressing Towards Goal

## 2022-10-21 NOTE — PROGRESS NOTES
D/C order noted for today. Orders reviewed. No needs identified at this time. CM spoke with patient, said her Sharlene Houser will be transporting her home today at time of discharge. CM remains available if needed.          Lawyer Tracy -[phone]

## 2022-10-21 NOTE — PROGRESS NOTES
Pt is alert and oriented and able to make needs known. No s/s of any pain or discomfort. Discharge instructions reviewed in full detail with the patient. Opportunity given for questions and answers provided. All belongings are with the patient. Pt was escorted out. Pt is discharged in stable condition.
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
## 2022-10-21 NOTE — PROGRESS NOTES
Shannon (P: [phone]) from [facility] is calling for an order of Diagnosis right beast mammogram and right breast limited ultrasound. (follow up to her screening mammogram)    Fax number is [phone]   Surgery Progress Note    10/21/2022    Admit Date: 10/20/2022    Subjective:     Ms. Juventino Christianson has complaints of nausea and is controlled with current plan. Patient has been ambulating in halls. Bowel Movements: None. Denies vomiting. Will begin initial drinking trial.     Objective:     Blood pressure 107/70, pulse 94, temperature 99.1 °F (37.3 °C), resp. rate 18, height 5' 6\" (1.676 m), weight 144.2 kg (318 lb), last menstrual period 09/25/2022, SpO2 93 %. 10/21 0701 - 10/21 1900  In: 120 [P.O.:120]  Out: 300 [Urine:300]    10/19 1901 - 10/21 0700  In: 3070.4 [P.O.:410; I.V.:2660.4]  Out: 5896 [Urine:1800]    EXAM: GENERAL: alert, pleasant, no distress   HEART: regular rate and rhythm   LUNGS: clear to auscultation   ABDOMEN:  Soft, obese, appropriate incisional tenderness, +BS, non-distended, surgical incisions clean, dry, no erythema or drainage   EXTREMITIES: warm, well perfused    Data Review    Recent Results (from the past 24 hour(s))   HCG URINE, QL. - POC    Collection Time: 10/20/22 10:13 AM   Result Value Ref Range    Pregnancy test,urine (POC) Negative NEG     CBC W/O DIFF    Collection Time: 10/21/22  4:56 AM   Result Value Ref Range    WBC 12.8 4.6 - 13.2 K/uL    RBC 4.95 4.20 - 5.30 M/uL    HGB 11.4 (L) 12.0 - 16.0 g/dL    HCT 36.6 35.0 - 45.0 %    MCV 73.9 (L) 78.0 - 100.0 FL    MCH 23.0 (L) 24.0 - 34.0 PG    MCHC 31.1 31.0 - 37.0 g/dL    RDW 15.9 (H) 11.6 - 14.5 %    PLATELET 243 310 - 327 K/uL    MPV 9.8 9.2 - 11.8 FL    NRBC 0.0 0  WBC    ABSOLUTE NRBC 0.00 0.00 - 0.01 K/uL       Assessment:   Danis Tucker is a 32 y.o. female,  day 1 status post   1. Laparoscopic bobo-en-Y gastric bypass  2.  Laparoscopic wedge biopsy of the left lobe of the liver  Condition: good    Plan:   -Ambulate every four hours  -Encourage use of incentive spirometer  -Pain managed prior to d/c   -Nausea managed prior to d/c   -Advance to Clear liquid Gastric Bypass Diet, if able to tolerate clear liquid diet (with pro shake) 4oz per hour for 3 hours prior to d/c    If patient continues to progress d/c home later today     Xena Garcia NP  9:56 AM  10/21/2022

## 2022-10-21 NOTE — ROUTINE PROCESS
Patient was educated on all discharge instructions below. He/she understood and was provided a copy. He/she knows who to call for any issues post discharge. Hydration  Hydration is your NUMBER ONE priority. Dehydration is the most common reason for readmission to the hospital. Dehydration occurs when  your body does not get enough fluid to keep it functioning at its best. Your body also requires fluid  to burn its stored fat calories for energy. Carry a bottle of water with you all day, even when you are away from home; remind yourself to  drink even if you dont feel thirsty. Drinking 64 ounces of fluid is your daily goal. You can tell if  youre getting enough fluid if youre making clear, light-colored urine five to 10 times per day. Signs of dehydration can be thirst, headache, hard stools or dizziness upon sitting or standing up. You should contact your surgeons office if you are unable to drink enough fluid to stay hydrated. --   [address] after Surgery   No lifting over 15 pounds for four weeks. No driving while taking the pain medication (about seven to 10 days). No tub baths, swimming or hot tubs until incisions are healed (about two weeks). You may shower. Clean incisions daily /gently with soap and check incisions for signs of infection:  -- Redness around incision. -- Swelling at site. -- Drainage with an foul odor (pus). -- Increase tenderness around incision. Take your temperature and resting pulse in the morning and evening. Record on tracking form  given to you. Call if your temperature is greater than 101 or your pulse rate is greater than 115. Please contact your surgeon if you are having excessive abdominal pain (that lasts longer than  four hours and does not improve with prescribed pain medication), vomiting or shortness of breath. Get up and move -- do not sit in one place for more than an hour.    You need to WALK (EXERCISE) for 30 minutes per day. -- Walking around your house does not count. -- Bike, treadmill and elliptical are OK. -- NO weight lifting or sit ups. If constipated take an adult dose of Miralax (available over the counter). Contact the doctors  office if Miralax doesnt help. You may swallow pills starting the day after surgery as long as they fit inside this [place]:   Continue to use your incentive spirometer (breathing machine) for the next couple of weeks to  help prevent pneumonia. [address] [employer]  Temperature/Heart Rate Log  Take your temperature and heart rate (pulse) twice a day for 14 days. Take both in the morning and  evening at about the same time each day (when you wake up and before you go to bed when you  are relaxed). Please contact your doctors office if your:   Temperature is higher than 101 degrees. Heart rate (pulse) is higher than 115 beats per minute  (normal heart rate is 60 to 100 beats per minute). How to Take Your Heart Rate (Pulse)   Turn your left hand so that your palm is face-up. With the index and middle fingers of your right  hand, draw a line from the base of your thumb to  just below the crease in your wrist. Your fingers  should nestle just to the left of the large tendon that  pops up when you bend your wrist toward you. Dont press too hard, that will make the pulse go  away. Use gentle pressure. Wait. It can take several seconds -- and several micro-adjustments in the placement of your two  fingers on your wrist -- to find your pulse. Just keep moving your fingers down or up your wrist  in small increments (and pausing for a few seconds) until you find it. How to Take Your Pulse Rate   Find a watch with a second hand and place it on your right wrist or on the table next  to your left hand. After finding your pulse, count the number of beats for 20 seconds.    Multiply by three to get your heart rate, or beats per minute  (or just count for 60 seconds for a math-free option). Normal, resting heart rate is about 60 to 100 beats per minute. Lovenox Self Injection Guide  Prepare  Step 1: Wash and dry your hands thoroughly. Step 2: Sit or lie in a comfortable position and choose an area  on the right or left side of the abdomen at least two inches away  from the belly button. Step 3: Clean the injection site with an alcohol swab and let dry. Inject  Step 4: Remove the needle cap by pulling it straight off the syringe and  discard it in a sharps . Step 5: With your other hand, pinch an inch of the cleansed area to  make a fold in the skin. Insert the full length of the needle straight  down -- at a 90? angle -- into the fold of skin. [address]  Step 6: Press the plunger with your thumb until the syringe is empty. Then pull the needle straight out and release the skin fold. Dispose  Step 7: Point the needle down and away from yourself and others,  and then push down on the plunger to activate the safety shield. Step 8: Place the used syringe in the sharps . Do NOT expel the air bubble from the syringe before the injection. Administration should be alternated between the left and right abdominal wall. The whole length  of the needle should be introduced into a skin fold held between the thumb and forefinger; the  skin fold should be held throughout the injection. To minimize bruising, do not rub the injection  site after completion of the injection. Questions about LOVENOX? Call [phone]    9. DIET AND LIFESTYLE  Key Diet Principles Following Bariatric Surgery   Begin each meal with soft moist high protein foods (i.e. chicken, turkey, yogurt, tuna, eggs,  cottage cheese, other fish and seafood). Consume a minimum of 64 ounces of fluid each day to prevent dehydration. No straws. No food and fluid together.  Stop drinking 30 minutes before a meal. You may begin fluids again  30 minutes after you finish a meal.   Eat very slowly and chew all foods completely. Keep portions small. No simple sugars or high fat foods. No carbonated beverages. No caffeine. Eat three meals per day. No skipping. Avoid snacking between meals. No alcohol. No smoking. Two Flintstones Complete Chewable vitamins each day. Take one in the morning and one at night.   1,500 milligrams Calcium Citrate per day in separate dosages. Vitamin D 3: 5,000 IU taken per day. Vitamin B-12: Take 1,000 micrograms sublingually daily. Iron: 60 milligrams per day from Bariat[employer] Advantage. Protein supplements of your choice. Must be low sugar (0 to 3 grams), low fat (0 to 3 grams) and  provide at least 35 to 40 grams of protein each day. You need 60 to 70 grams of protein (food  and supplements) each day. Minimum of 30 minutes of physical activity daily. Do not take NSAIDS . Do not take Steroids without your surgeons permission. Your Priorities After Surgery  ? Fluid: 64 ounces of fluid per day. ? Protein: 60 to 70 grams of protein per day. ? Walk every day. Clear Liquid Diet  One week of clear liquids: minimum of 64 ounces of fluid per day. Fluid:   Zero calorie liquids. No caffeine. No carbonation. No sugary drinks. No alcohol. No straws. Food   Protein drinks. Less than 3 grams of sugar and  3 grams of fat per serving. Protein drink should provide you with  60 to 70 grams of protein. Soft Protein Diet  Five weeks of soft protein (1 ounce for soft protein, 3 ounces of yogurt/cottage cheese). Three meals per day and 1 protein shake. Protein shakes should provide you with 30 grams of  protein on the soft protein diet. Slow transition:   First week on soft protein diet -- focus on yogurt, cottage cheese, eggs, vegetarian refried beans,  black beans, kidney beans and white beans.  (NO BAKED BEANS.)   Second through fourth week on soft protein diet -- focus on yogurt, cottage cheese, eggs,  canned tuna, canned chicken, tilapia and fish (needs to be soft enough to be cut up with a fork). Fifth week on soft protein diet -- focus on yogurt, cottage cheese, eggs, canned tuna, canned  chicken, tilapia, fish, salmon, chicken breast or turkey. Fluid is your #1 Priority! Continue clear liquids between meals. You will need 64 ounces of fluid per day. Fluids that you can have include:   Water. Zero calorie liquids. You will need to sip throughout the day and should therefore have a water bottle with you at all  times! No liquids with your meals. Stop 30 minutes before a meal and wait 30 minutes after a meal.ugary drinks. No alco  Protein  You will need 60 to 70 grams of protein per day. 60 to 70 grams of protein shakes when on the clear liquid diet (two to three shakes per day). 30 to 50 grams of protein shakes when on the soft protein diet (one shake per day). Eat Three Times Per Day  You will need to eat three times per day. My planned times are:  _________________________________________________________  _________________________________________________________  _________________________________________________________  Nausea, Vomiting, Stomach Pain  If you have problems with nausea, vomiting or stomach pain, try:   Eating slowly: 20 to 30 minutes per meal.   Chewing food thoroughly: 20 to 30 chews before food is swallowed. Small portions: measure portions in medicine cup. Stopping before feeling full. AVOIDING SUGAR and FRIED FOOD: sugar will cause dumping syndrome and lead to weight gain. Exercise  I will need to get a minimum of 30 minutes of exercise per day or 150 minutes of exercise per week. Walking, swimming, biking or elliptical.   Find something you enjoy! Vitamins  After surgery, you will need to take the following vitamins for the rest of your life -- FOREVER. Vitamin D 3: 5,000 IU per day. Calcium Citrate: 1,500 milligrams, taken separately.    Flintstones Complete: two per day, taken separately. Sublingual Vitamin B-12: 1,000 micrograms daily. Iron for menstruating women or patients with a history of low iron: 65 milligrams daily. We recommend going to [email or web address] and purchasing iron from there. The lemon-lime has 60 milligrams. This iron is better absorbed than over-the-counter iron. Clear Liquid Log  Getting your fluid in is top priority during this week. Fluids (MINIUM of 64 ounces per day):  ? 8 oz. ? 8 oz. ? 8 oz. ? 8 oz. ? 8 oz. ? 8 oz. ? 8 oz. ? 8 oz. ? 8 oz. ? 8 oz. ? 8 oz. ? 8 oz. Flintstones Complete Chewable: ? a.m. ? p.m. Calcium Citrate (1,500 milligrams/day):  Pill form  ? Two crushed pills (morning) ? Two crushed pills (afternoon) ? Two crushed pills (evening)  OR Upcal D (powder)  ? One pack/scoop ? One pack/scoop ? One pack/scoop  OR Celebrate Chewable Vitamins (500 mg each) or Bariatric Advantage Chewables (500 mg)  ? One chewable (morning) ? One chewable (afternoon) ? One chewable (evening)  OR Liquid Calcium Citrate  ? 1 tbsp. Calcium Citrate ? 1 tbsp. Calcium Citrate ? 1 tbsp. Calcium Citrate  Vitamin D3: ? 5,000 IU daily. Vitamin B-12: ? 1,000 micrograms per day. Iron (menstruating women or patients with a history of low iron):  ? 60 milligrams per day from [employer]. Protein drinks (protein drinks should be under 3 grams of sugar and 3 grams of fat). Protein shake (60 grams per day): ? a.m. ? p.m. Exercise: ? 30 to 40 minutes per day. Bariatric Soft and Moist Diet Shopping List   alcium Citrate (1,500 milligrams/day):  Pill form  ? Two crushed pills (morning) ? Two crushed pills (afternoon) ? Two crushed pills (evening)  OR Upcal D (powder)  ? One pack/scoop ? One pack/scoop ? One pack/scoop  OR Celebrate Chewable Vitamins (500 mg each) or Bariatric Advantage Chewables (500 mg)  ? One chewable (morning) ? One chewable (afternoon) ? One chewable (evening)  OR Liquid Calcium Citrate  ? 1 tbsp. Calcium Citrate ? 1 tbsp.  Calcium Citrate ? 1 tbsp. Calcium Citrate  Vitamin D3: ? 5,000 IU daily  Vitamin B-12: ? 1,000 micrograms per day  Iron (menstruating women or  patients with a history of low iron):  ? 60 milligrams per day  from B[facility] Advantage  Protein drinks (protein drinks should be under  3 grams of sugar and 3 grams of fat). Protein shake (30 to 40 grams per day):  ? a.m. ? p.m. Exercise: ? 30 to 40 minutes per  Educated on Diet Progression    Naseem Miles Gastric Bypass and Sleeve Dietary Progression    Patient Name:   Date of Surgery: Ice Chips start once admitted on floor. Begin Bariatric Clear Liquid Diet on:     Clear Liquid Diet: 64 oz. of fluid per day  Low calorie, low sugar, non-carbonated beverages  Water, Crystal Light, Propel Water, Sugar Free Jell-O, Sugar Free Popsicles, Bouillon  Start protein supplement during this stage. (60-70 grams per day)  Getting your fluid in and staying hydrated is your #1 priority! The clear liquid diet will last for 7 days. Begin Bariatric Soft and Moist on: This stage of the diet will last for 5 weeks, unless otherwise instructed by your surgeon. Begin:  1 week post-op   End:  6 weeks post-op (or when you follow up with the Registered Dietitian)    Soft, moist, high protein foods: 3 meals per day plus protein supplements. Portions should emphasize on soft protein. Portions will be a MAXIMUM of:   1 ounce of solid food   2-3 ounces of cottage cheese and yogurt. Protein supplements should be between meals and provide 30-40 grams per day during soft protein diet. Continue to get 64 ounces of fluid in per day. Protein foods that are ok on the Soft and Moist Diet include:  Slow transition:  1st week on soft protein should focus on:  Yogurt, cottage cheese, eggs  2nd -4th  week on soft protein diet should focus on: yogurt, cottage cheese, eggs, canned tuna, canned chicken, tilapia, fish (needs to be soft enough to be cut up with a fork)  5th week on soft protein diet should focus on: Yogurt, cottage cheese, eggs, canned tuna, canned chicken, tilapia, fish, salmon, chicken breast, or turkey. Remember to continue to get 64 ounces of fluid daily on ALL Stages. To be advanced to Bariatric Maintenance Stage of the bariatric diet, follow up with the dietitian 6 weeks post-op, around:         For any additional questions, please refer to your blue binder that was provided to you at the start of the bariatric program.

## 2022-10-21 NOTE — PROGRESS NOTES
Patient is JASE (in class). Patient is not available to be assessed at this time.       Gwen Navarro 5 [phone]

## 2022-10-21 NOTE — PROGRESS NOTES
Received discharge prescriptions for patient at outpatient pharmacy. Patient has NO Copay. Will deliver when ready.

## 2022-10-21 NOTE — DISCHARGE SUMMARY
Bariatric Surgery Discharge Progress Note    Admission Date: 10/20/2022    Discharge Date: 10/21/2022    Preoperative Diagnosis: Morbid obesity ([facility] [E66.01]  Gastroesophageal reflux disease, unspecified whether esophagitis present [K21.9]  Sleep apnea, unspecified type [G47.30]      Postoperative Diagnosis: Morbid obesity ([facility] [E66.01]  Gastroesophageal reflux disease, unspecified whether esophagitis present [K21.9]  Sleep apnea, unspecified type [G47.30]   NAFLD  Hepatomegaly     Procedure:   Laparoscopic bobo-en-Y gastric bypass  Laparoscopic wedge biopsy of the left lobe of the liver    Postop Complications: none    Hospital Course:  Patient was admitted on 10/20/2022 for scheduled bariatric surgery. Operation was without significant complication. Patient admitted to the floor postoperatively, monitored as per protocol. Diet sequentially advanced beginning POD 1, pain medications transitioned to oral during the hospital course. Currently the patient is afebrile, vital signs stable, tolerating a clear liquid diet with protein supplementation, voiding spontaneously, ambulatory with adequate pain control with oral medications and clear surgical sites without evidence of infection. Discharge Diet:  Clear Liquid Bariatric Diet for 7 days, then soft moist protein diet for 5 weeks    Discharge Medications:  Current Discharge Medication List        START taking these medications    Details   oxyCODONE IR (ROXICODONE) 5 mg immediate release tablet Take 1 Tablet by mouth every six (6) hours as needed for Pain for up to 3 days. Max Daily Amount: 20 mg.  Qty: 10 Tablet, Refills: 0  Start date: 10/21/2022, End date: 10/24/2022    Associated Diagnoses: Acute post-operative pain      ondansetron (ZOFRAN ODT) 4 mg disintegrating tablet Take 1 Tablet by mouth every eight (8) hours as needed for Nausea or Vomiting.   Qty: 15 Tablet, Refills: 0  Start date: 10/21/2022           CONTINUE these medications which have NOT CHANGED    Details   omeprazole (PRILOSEC) 20 mg capsule Take 20 mg by mouth daily as needed. cholecalciferol (VITAMIN D3) (1000 Units /25 mcg) tablet Take 1,000 Units by mouth daily. multivit-iron-FA-calcium-mins (One-A-Day Womens Formula) 18 mg iron-400 mcg-500 mg Ca tab Take 1 Tab by mouth daily. enoxaparin (Lovenox) 40 mg/0.4 mL 0.4 mL by SubCUTAneous route daily for 7 days. Qty: 7 Each, Refills: 0  Start date: 10/20/2022, End date: 10/27/2022    Comments: Surg 10/20           STOP taking these medications       ferrous sulfate 325 mg (65 mg iron) tablet Comments:   Reason for Stopping:               Must Take:   Bariatric Chewable vitamins, 2 orally daily for life  Calcium Citrate 1500 mg orally daily for life  Vitamin B12 1000 micrograms sublingual daily for life  Vitamin D3 5,000 units orally daily for life   Vitamin B1 100 mg orally daily for life    Discharge disposition: home    Discharge condition: stable      Local wound care with daily showers, keep wounds clean and dry     Activity: as desired, no lifting, pushing, or pulling greater than 15lbs or situps for 30 days     Special Instructions:            - No driving until activity is not influenced by incisional pain and off narcotics            - No bath or hot tub until wounds are healed            - Check pulse and temperature twice daily for 10 days            - Notify [employer] for a Temp >100.5 or Pulse>115  - Schedule to see PCP within 30 days of surgery to discuss any medication or health status changes.      Follow-up with your surgeon in 2 weeks, call office for appointment [phone] ([address] [facility]) [address] [facility])

## 2022-10-21 NOTE — PROGRESS NOTES
Care Management Interventions  PCP Verified by CM: Yes  Mode of Transport at Discharge: Self (Patient's Taiwo Pino will be transporting patient home at time of discharge. )  Transition of Care Consult (CM Consult): Discharge Planning  Discharge Durable Medical Equipment: No  Physical Therapy Consult: No  Occupational Therapy Consult: No  Speech Therapy Consult: No  Support Systems: Other (Comment) (Patient lives alone, but patient's brother will be staying with her to provide assistance after discharge.)  Confirm Follow Up Transport: Family  Discharge Location  Patient Expects to be Discharged to[de-identified] Home with family assistance      Patient lives alone, no steps to enter. Patient's brother will be staying with patient after discharge to provide assistance. No DME used at home, no home health services used at home, not a Dialysis patient, and does not use Oxygen at home. Patient's Aunt will be transporting patient home at time of discharge. No CM needs at this time.                Martha Stahl RN  Case Management [phone]

## 2022-11-07 ENCOUNTER — OFFICE VISIT (OUTPATIENT)
Dept: SURGERY | Age: 27
End: 2022-11-07
Payer: MEDICAID

## 2022-11-07 VITALS
OXYGEN SATURATION: 98 % | HEART RATE: 78 BPM | TEMPERATURE: 98.3 F | SYSTOLIC BLOOD PRESSURE: 102 MMHG | DIASTOLIC BLOOD PRESSURE: 68 MMHG | RESPIRATION RATE: 18 BRPM | HEIGHT: 66 IN | BODY MASS INDEX: 47.09 KG/M2 | WEIGHT: 293 LBS

## 2022-11-07 DIAGNOSIS — K91.2 POSTOPERATIVE MALABSORPTION: ICD-10-CM

## 2022-11-07 DIAGNOSIS — K21.9 GASTROESOPHAGEAL REFLUX DISEASE, UNSPECIFIED WHETHER ESOPHAGITIS PRESENT: ICD-10-CM

## 2022-11-07 DIAGNOSIS — Z98.84 BARIATRIC SURGERY STATUS: ICD-10-CM

## 2022-11-07 DIAGNOSIS — G47.33 OBSTRUCTIVE SLEEP APNEA SYNDROME: ICD-10-CM

## 2022-11-07 DIAGNOSIS — E78.2 MIXED HYPERLIPIDEMIA: ICD-10-CM

## 2022-11-07 DIAGNOSIS — E28.2 PCOS (POLYCYSTIC OVARIAN SYNDROME): ICD-10-CM

## 2022-11-07 DIAGNOSIS — E66.01 MORBID OBESITY (HCC): Primary | ICD-10-CM

## 2022-11-07 PROCEDURE — 99024 POSTOP FOLLOW-UP VISIT: CPT | Performed by: SURGERY

## 2022-11-07 RX ORDER — CALCIUM CITRATE/VITAMIN D3 500MG-12.5
500 TABLET,CHEWABLE ORAL 3 TIMES DAILY
COMMUNITY

## 2022-11-07 RX ORDER — URSODIOL 200 MG/1
CAPSULE ORAL
Qty: 90 EACH | Refills: 5 | Status: SHIPPED | OUTPATIENT
Start: 2022-11-07

## 2022-11-07 RX ORDER — OMEPRAZOLE 40 MG/1
40 CAPSULE, DELAYED RELEASE ORAL DAILY
Qty: 90 CAPSULE | Refills: 2 | Status: SHIPPED | OUTPATIENT
Start: 2022-11-07

## 2022-11-07 RX ORDER — ONDANSETRON 4 MG/1
4 TABLET, ORALLY DISINTEGRATING ORAL
Qty: 20 TABLET | Refills: 2 | Status: SHIPPED | OUTPATIENT
Start: 2022-11-07
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
## 2022-11-07 NOTE — PROGRESS NOTES
Bal Lee is a 32 y.o. female  Chief Complaint   Patient presents with    Post OP Follow Up     GBP 10/20/2022. Pt ID confirmed    Weight Loss Metrics 11/7/2022 11/7/2022 10/20/2022 10/14/2022 9/19/2022 9/19/2022 8/30/2022   Pre op / Initial Wt 318 - - - 318 - 318   Today's Wt - 301 lb - 318 lb - 318 lb -   BMI - 48.58 kg/m2 51.33 kg/m2 - - 51.33 kg/m2 -   Ideal Body Wt 137 - - - 137 - 137   Excess Body Wt 181 - - - 181 - 181   Goal Wt 174 - - - 174 - 174   Wt loss to date 17 - - - 0 - 0   % Wt Loss 0.12 - - - 0 - 0   80% .8 - - - 144.8 - 144.8       Body mass index is 48.58 kg/m².

## 2022-11-11 ENCOUNTER — TELEPHONE (OUTPATIENT)
Dept: SURGERY | Age: 27
End: 2022-11-11

## 2022-11-11 DIAGNOSIS — R11.2 NAUSEA AND VOMITING, UNSPECIFIED VOMITING TYPE: ICD-10-CM

## 2022-11-11 DIAGNOSIS — Z98.84 S/P GASTRIC BYPASS: Primary | ICD-10-CM

## 2022-11-11 RX ORDER — PROMETHAZINE HYDROCHLORIDE 12.5 MG/1
12.5 SUPPOSITORY RECTAL
Qty: 12 SUPPOSITORY | Refills: 1 | Status: SHIPPED | OUTPATIENT
Start: 2022-11-11 | End: 2022-11-29

## 2022-11-11 NOTE — TELEPHONE ENCOUNTER
Radha from [address] [facility] called for orders on banana bag and tylenol for complaints of headache   Verbal order from Lucrecia Ochoa for vitamins to be put in normal saline and ok to give tylenol 500 milligrams one time for complaint of headache readback completed and orders given to [facility] with readback completed

## 2022-11-11 NOTE — TELEPHONE ENCOUNTER
Patient called to say she is not any better. Unable to get her fluids or protein supplement in averaging around 20-30 ounces a day and is having eggs and at times fluids coming back up temp has been in normal range and she states pulse 94 discussed with Eri Chacko NP and to make arrangements for iv infusion today. [facility] contacted and orders faxed to them for iv infusion.

## 2022-11-11 NOTE — TELEPHONE ENCOUNTER
Patient called to say she is not any better.  Unable to get her fluids or protein supplement in averaging around 20-30 ounces a day and is having eggs and at times fluids coming back up temp has been in normal range and she states pulse 94 discussed with Na Santiago NP and to make arragements for IV infusion today

## 2022-11-29 ENCOUNTER — OFFICE VISIT (OUTPATIENT)
Dept: SURGERY | Age: 27
End: 2022-11-29
Payer: MEDICAID

## 2022-11-29 VITALS
HEART RATE: 88 BPM | BODY MASS INDEX: 45.96 KG/M2 | DIASTOLIC BLOOD PRESSURE: 68 MMHG | SYSTOLIC BLOOD PRESSURE: 108 MMHG | WEIGHT: 286 LBS | TEMPERATURE: 97.3 F | RESPIRATION RATE: 18 BRPM | HEIGHT: 66 IN | OXYGEN SATURATION: 97 %

## 2022-11-29 DIAGNOSIS — K59.00 CONSTIPATION, UNSPECIFIED CONSTIPATION TYPE: ICD-10-CM

## 2022-11-29 DIAGNOSIS — K91.2 POST-RESECTION MALABSORPTION: Primary | ICD-10-CM

## 2022-11-29 DIAGNOSIS — N94.6 MENSTRUAL CRAMPS: ICD-10-CM

## 2022-11-29 DIAGNOSIS — G89.29 CHRONIC PAIN OF BOTH KNEES: ICD-10-CM

## 2022-11-29 DIAGNOSIS — M25.562 CHRONIC PAIN OF BOTH KNEES: ICD-10-CM

## 2022-11-29 DIAGNOSIS — E66.01 MORBID OBESITY (HCC): ICD-10-CM

## 2022-11-29 DIAGNOSIS — R10.12 ABDOMINAL DISCOMFORT IN LEFT UPPER QUADRANT: ICD-10-CM

## 2022-11-29 DIAGNOSIS — M25.561 CHRONIC PAIN OF BOTH KNEES: ICD-10-CM

## 2022-11-29 DIAGNOSIS — Z98.84 S/P GASTRIC BYPASS: ICD-10-CM

## 2022-11-29 PROCEDURE — 99024 POSTOP FOLLOW-UP VISIT: CPT | Performed by: REGISTERED NURSE

## 2022-11-29 RX ORDER — DICLOFENAC SODIUM 10 MG/G
GEL TOPICAL 4 TIMES DAILY
Qty: 1 EACH | Refills: 1 | Status: SHIPPED | OUTPATIENT
Start: 2022-11-29

## 2022-11-29 RX ORDER — LACTULOSE 10 G/15ML
10 SOLUTION ORAL; RECTAL 2 TIMES DAILY
Qty: 480 ML | Refills: 1 | Status: SHIPPED | OUTPATIENT
Start: 2022-11-29

## 2022-11-29 NOTE — PROGRESS NOTES
1. Have you been to the ER, urgent care clinic since your last visit? Hospitalized since your last visit? No    2. Have you seen or consulted any other health care providers outside of the [address] [place] [address] since your last visit? Include any pap smears or colon screening.  Yes opic and gyn   Pt ID confirmed

## 2022-11-29 NOTE — LETTER
NOTIFICATION RETURN TO WORK / SCHOOL    11/29/2022 11:57 AM    Ms. Sousa  [address] [address]      To Whom It May Concern:    Lars is currently under the care of [facility]. She will return to work on: 12/6/2022. Restrictions: May not lift greater than 20 lbs for 2 months. If there are questions or concerns please contact our office.         Sincerely,      Davida Greene NP
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
## 2022-12-01 ENCOUNTER — DOCUMENTATION ONLY (OUTPATIENT)
Dept: BARIATRICS/WEIGHT MGMT | Age: 27
End: 2022-12-01

## 2022-12-01 ENCOUNTER — HOSPITAL ENCOUNTER (OUTPATIENT)
Dept: BARIATRICS/WEIGHT MGMT | Age: 27
Discharge: HOME | End: 2022-12-01

## 2022-12-01 NOTE — PROGRESS NOTES
[facility]  POST-OP NUTRITION FOLLOW UP    Patient's Name: Pito Jacobs  YOB: 1995  Surgery Date: 10/22      Procedure: Gastric Bypass    Surgeon: Dr. Lynnette Salcedo    Height: 5 f 6     Pre-Op Weight: 318     Current Weight: 286  Weight Lost: 32    BMI:  46  % EBW lost: 22    Attendance of support group:   When:   Why not:     Complications  Readmittance: None  Reoperations: None  Complications: Dehydration  IV Fluids: Yes. Patient states she had to get IV fluid  ER Trips: None    Problem Areas:   Nausea: none   Vomiting: in the beginning   Dumping Syndrome: None  Inadequate Protein: None, getting 1 shake per day  Inadequate Fluids: Yes, only 35 ounces  Food Intolerance: None  Hunger: Yes  Constipation: None    Eating 3 Meals/Day: Yes  Portion Size at Meals: 1 ounce     Protein from Food: Yes    Foods being consumed:  Breakfast: Time:egg    Lunch: Time: protein shake     Dinner:  Time: Yogurt   None  In-between eating:    Length of time for meals: 20 minutes    food/fluids: 30/30    Fluids: 35 oz/day   Types of Fluids: None    MVI: Pro care    Number/Day: 1 x a day   Taken Separately:     Vitamin B1: Included  Dosin mg    Calcium: citrate pills    Calcium Dosin mg    Taken Separately:     Vitamin B12: yes   Vitamin B12 Dosin mcg    Vitamin D: Yes     Vitamin D dosin IU    Iron:     Iron dosin mg     Protein Supplement: Premier     Grams of Protein: 30   Mixed with:     Splitting Protein Drink in 1/2:    Timing of Protein Drinks:   Patient is taking 7 days a week. Exercise: walking daily      Comments:    Patient is doing okay at 6 weeks post op. Her portions are appropriate. She is compliant with her vitamins. She is struggling with fluid stating she is just too full. I have made suggestions to help her increase her fluid intake. I have recommended the 8 ounce water bottles and aiming for 1 every hour to help gauge what a sip should be. She is walking daily. I    Patient was educated on the importance of eating meat and vegetables only. I have talked with patient about the effects of carbohydrates, not only from a weight management perspective, but also what effects it could have on their blood sugar and what reactive hypoglycemia is. Diet Follow Up:  9 month class scheduled for: Will schedule at a later date.       Lila Price [address]    12/1/2022

## 2022-12-14 ENCOUNTER — OFFICE VISIT (OUTPATIENT)
Dept: SURGERY | Age: 27
End: 2022-12-14
Payer: MEDICAID

## 2022-12-14 VITALS
OXYGEN SATURATION: 98 % | WEIGHT: 283 LBS | HEART RATE: 66 BPM | RESPIRATION RATE: 16 BRPM | BODY MASS INDEX: 45.48 KG/M2 | TEMPERATURE: 98 F | DIASTOLIC BLOOD PRESSURE: 76 MMHG | HEIGHT: 66 IN | SYSTOLIC BLOOD PRESSURE: 120 MMHG

## 2022-12-14 DIAGNOSIS — M54.9 MID BACK PAIN ON LEFT SIDE: Primary | ICD-10-CM

## 2022-12-14 DIAGNOSIS — K91.2 POST-RESECTION MALABSORPTION: ICD-10-CM

## 2022-12-14 DIAGNOSIS — Z98.84 S/P GASTRIC BYPASS: ICD-10-CM

## 2022-12-14 DIAGNOSIS — R10.12 ABDOMINAL DISCOMFORT IN LEFT UPPER QUADRANT: ICD-10-CM

## 2022-12-14 DIAGNOSIS — E66.01 MORBID OBESITY (HCC): ICD-10-CM

## 2022-12-14 PROCEDURE — 99024 POSTOP FOLLOW-UP VISIT: CPT | Performed by: REGISTERED NURSE

## 2022-12-14 RX ORDER — CYCLOBENZAPRINE HCL 5 MG
5 TABLET ORAL
Qty: 30 TABLET | Refills: 0 | Status: SHIPPED | OUTPATIENT
Start: 2022-12-14

## 2022-12-14 RX ORDER — LIDOCAINE 50 MG/G
1 PATCH TOPICAL EVERY 24 HOURS
Qty: 30 EACH | Refills: 1 | Status: SHIPPED | OUTPATIENT
Start: 2022-12-14

## 2022-12-14 RX ORDER — MULTIVIT-MIN/IRON/FOLIC ACID/K 45-800-120
CAPSULE ORAL
COMMUNITY

## 2022-12-14 NOTE — LETTER
NOTIFICATION RETURN TO WORK    12/14/2022 1:23 PM    Ms. Mary [address] [id number]      To Whom It May Concern:    Joshua Marie is currently under the care of Wilfred Dutta. She will return to work on January 3, 2023 with restrictions of no lifting more than 20lb. If there are questions or concerns please have the patient contact our office.         Sincerely,      Bailey Quick NP

## 2022-12-14 NOTE — PROGRESS NOTES
Bandar Rose is a 32 y.o. female (: 1995) presenting to address:    Chief Complaint   Patient presents with    Abdominal Pain     Left sided. Medication list and allergies have been reviewed with Bandar Rose and updated as of today's date. I have gone over all Medical, Surgical and Social History with Bandar Rose and updated/added the information accordingly. 1. Have you been to the ER, urgent care clinic since your last visit? Hospitalized since your last visit? No    2. Have you seen or consulted any other health care providers outside of the [address] [place] [address] since your last visit? Include any pap smears or colon screening.  No
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
## 2023-02-15 ENCOUNTER — HOSPITAL ENCOUNTER (OUTPATIENT)
Facility: HOSPITAL | Age: 28
Discharge: HOME | End: 2023-02-18

## 2023-02-15 LAB — SENTARA SPECIMEN COLLECTION: NORMAL

## 2023-02-15 PROCEDURE — 99001 SPECIMEN HANDLING PT-LAB: CPT

## 2023-02-16 LAB
A/G RATIO: 1.6 RATIO (ref 1.1–2.6)
ALBUMIN SERPL-MCNC: 3.9 G/DL (ref 3.5–5)
ALP BLD-CCNC: 47 U/L (ref 25–115)
ALT SERPL-CCNC: 31 U/L (ref 5–40)
ANION GAP SERPL CALCULATED.3IONS-SCNC: 10 MMOL/L (ref 3–15)
AST SERPL-CCNC: 27 U/L (ref 10–37)
BASOPHILS # BLD: 0 % (ref 0–2)
BASOPHILS ABSOLUTE: 0 K/UL (ref 0–0.2)
BILIRUB SERPL-MCNC: 0.3 MG/DL (ref 0.2–1.2)
BUN BLDV-MCNC: 5 MG/DL (ref 6–22)
CALCIUM SERPL-MCNC: 9.2 MG/DL (ref 8.4–10.5)
CHLORIDE BLD-SCNC: 105 MMOL/L (ref 98–110)
CO2: 27 MMOL/L (ref 20–32)
CREAT SERPL-MCNC: 0.6 MG/DL (ref 0.5–1.2)
EOSINOPHIL # BLD: 1 % (ref 0–6)
EOSINOPHILS ABSOLUTE: 0.1 K/UL (ref 0–0.5)
FERRITIN: 71 NG/ML (ref 10–291)
FOLATE: 3.9 NG/ML
GLOBULIN: 2.5 G/DL (ref 2–4)
GLOMERULAR FILTRATION RATE: >60 ML/MIN/1.73 SQ.M.
GLUCOSE: 89 MG/DL (ref 70–99)
HCT VFR BLD CALC: 44.2 % (ref 35.1–46.5)
HEMOGLOBIN: 13.5 G/DL (ref 11.7–15.5)
IRON: 54 MCG/DL (ref 30–160)
LYMPHOCYTES # BLD: 33 % (ref 20–45)
LYMPHOCYTES ABSOLUTE: 1.8 K/UL (ref 1–4.8)
MCH RBC QN AUTO: 25 PG (ref 26–34)
MCHC RBC AUTO-ENTMCNC: 31 G/DL (ref 31–36)
MCV RBC AUTO: 83 FL (ref 80–99)
MONOCYTES ABSOLUTE: 0.5 K/UL (ref 0.1–1)
MONOCYTES: 9 % (ref 3–12)
NEUTROPHILS ABSOLUTE: 3 K/UL (ref 1.8–7.7)
NEUTROPHILS: 57 % (ref 40–75)
PDW BLD-RTO: 18.9 % (ref 10–15.5)
PLATELET # BLD: 353 K/UL (ref 140–440)
PMV BLD AUTO: 10.7 FL (ref 9–13)
POTASSIUM SERPL-SCNC: 4.2 MMOL/L (ref 3.5–5.5)
RBC: 5.31 M/UL (ref 3.8–5.2)
SODIUM BLD-SCNC: 142 MMOL/L (ref 133–145)
TOTAL PROTEIN: 6.4 G/DL (ref 6.4–8.3)
VITAMIN B-12: 610 PG/ML (ref 211–911)
VITAMIN D 25-HYDROXY: 37.9 NG/ML (ref 32–100)
WBC: 5.3 K/UL (ref 4–11)

## 2023-02-23 ENCOUNTER — OFFICE VISIT (OUTPATIENT)
Age: 28
End: 2023-02-23
Payer: MEDICAID

## 2023-02-23 VITALS
HEIGHT: 66 IN | DIASTOLIC BLOOD PRESSURE: 80 MMHG | TEMPERATURE: 97 F | HEART RATE: 96 BPM | WEIGHT: 259 LBS | RESPIRATION RATE: 18 BRPM | SYSTOLIC BLOOD PRESSURE: 124 MMHG | BODY MASS INDEX: 41.62 KG/M2 | OXYGEN SATURATION: 99 %

## 2023-02-23 DIAGNOSIS — E66.01 MORBID OBESITY (HCC): ICD-10-CM

## 2023-02-23 DIAGNOSIS — Z98.84 S/P GASTRIC BYPASS: ICD-10-CM

## 2023-02-23 DIAGNOSIS — G47.9 DIFFICULTY SLEEPING: ICD-10-CM

## 2023-02-23 DIAGNOSIS — K91.2 POST-RESECTION MALABSORPTION: Primary | ICD-10-CM

## 2023-02-23 DIAGNOSIS — Z98.84 NONCOMPLIANCE WITH REQUIRED DIETARY REGIMEN FOLLOWING BARIATRIC SURGERY: ICD-10-CM

## 2023-02-23 DIAGNOSIS — Z91.119 NONCOMPLIANCE WITH REQUIRED DIETARY REGIMEN FOLLOWING BARIATRIC SURGERY: ICD-10-CM

## 2023-02-23 PROCEDURE — 99214 OFFICE O/P EST MOD 30 MIN: CPT | Performed by: REGISTERED NURSE

## 2023-02-23 ASSESSMENT — ENCOUNTER SYMPTOMS
CONSTIPATION: 0
CHEST TIGHTNESS: 0
BLOOD IN STOOL: 0
VOMITING: 0
ANAL BLEEDING: 0
NAUSEA: 0
ABDOMINAL PAIN: 0
COUGH: 0
ABDOMINAL DISTENTION: 0
RECTAL PAIN: 0
DIARRHEA: 1
SHORTNESS OF BREATH: 0
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
## 2023-02-23 NOTE — PROGRESS NOTES
Chief Complaint   Patient presents with    Post-Op Check     [facility] 10/2022    1. Have you been to the ER, urgent care clinic since your last visit? Hospitalized since your last visit? No    2. Have you seen or consulted any other health care providers outside of the [address] [place] [address] since your last visit? Include any pap smears or colon screening.  No

## 2023-05-09 ENCOUNTER — TELEPHONE (OUTPATIENT)
Age: 28
End: 2023-05-09

## 2023-05-09 NOTE — TELEPHONE ENCOUNTER
Patient called requesting a infusion, stating that she feels light headed, dizzy, \"unable to eat anything really\",  along with some nausea. Patient stated she gets 30 fl oz of water in a day, but she tries for 64. And she stated that she's taking her vitamins as prescribed.

## 2023-05-10 ENCOUNTER — TELEPHONE (OUTPATIENT)
Age: 28
End: 2023-05-10

## 2023-05-10 NOTE — TELEPHONE ENCOUNTER
Patient is scheduled for infusion tomorrow, patient says she cannot do tomorrow and needs it changed to Friday.

## 2023-05-12 ENCOUNTER — HOSPITAL ENCOUNTER (OUTPATIENT)
Facility: HOSPITAL | Age: 28
Setting detail: INFUSION SERIES
End: 2023-05-12
Payer: MEDICAID

## 2023-05-12 VITALS
OXYGEN SATURATION: 98 % | SYSTOLIC BLOOD PRESSURE: 111 MMHG | DIASTOLIC BLOOD PRESSURE: 79 MMHG | TEMPERATURE: 98 F | RESPIRATION RATE: 16 BRPM | HEART RATE: 66 BPM

## 2023-05-12 PROCEDURE — 96360 HYDRATION IV INFUSION INIT: CPT

## 2023-05-12 PROCEDURE — 96374 THER/PROPH/DIAG INJ IV PUSH: CPT

## 2023-05-12 PROCEDURE — 96375 TX/PRO/DX INJ NEW DRUG ADDON: CPT

## 2023-05-12 PROCEDURE — 2580000003 HC RX 258: Performed by: REGISTERED NURSE

## 2023-05-12 PROCEDURE — 96361 HYDRATE IV INFUSION ADD-ON: CPT

## 2023-05-12 PROCEDURE — 6360000002 HC RX W HCPCS: Performed by: REGISTERED NURSE

## 2023-05-12 RX ORDER — IBUPROFEN 200 MG
1 CAPSULE ORAL 3 TIMES DAILY
COMMUNITY

## 2023-05-12 RX ORDER — SODIUM CHLORIDE 0.9 % (FLUSH) 0.9 %
5-40 SYRINGE (ML) INJECTION 2 TIMES DAILY
Status: DISCONTINUED | OUTPATIENT
Start: 2023-05-12 | End: 2023-10-28 | Stop reason: HOSPADM

## 2023-05-12 RX ORDER — SODIUM CHLORIDE, SODIUM LACTATE, POTASSIUM CHLORIDE, CALCIUM CHLORIDE 600; 310; 30; 20 MG/100ML; MG/100ML; MG/100ML; MG/100ML
INJECTION, SOLUTION INTRAVENOUS CONTINUOUS
Status: DISCONTINUED | OUTPATIENT
Start: 2023-05-12 | End: 2023-10-28 | Stop reason: HOSPADM

## 2023-05-12 RX ORDER — MULTIVIT-MIN/IRON/FOLIC ACID/K 45-800-120
1 CAPSULE ORAL DAILY
COMMUNITY

## 2023-05-12 RX ORDER — ONDANSETRON 2 MG/ML
4 INJECTION INTRAMUSCULAR; INTRAVENOUS ONCE
Status: COMPLETED | OUTPATIENT
Start: 2023-05-12 | End: 2023-05-12

## 2023-05-12 RX ADMIN — SODIUM CHLORIDE, PRESERVATIVE FREE 10 ML: 5 INJECTION INTRAVENOUS at 09:49

## 2023-05-12 RX ADMIN — SODIUM CHLORIDE, POTASSIUM CHLORIDE, SODIUM LACTATE AND CALCIUM CHLORIDE: 600; 310; 30; 20 INJECTION, SOLUTION INTRAVENOUS at 08:45

## 2023-05-12 RX ADMIN — SODIUM CHLORIDE, PRESERVATIVE FREE 10 ML: 5 INJECTION INTRAVENOUS at 08:34

## 2023-05-12 RX ADMIN — ONDANSETRON 4 MG: 2 INJECTION INTRAMUSCULAR; INTRAVENOUS at 08:38

## 2023-05-12 NOTE — PROGRESS NOTES
[facility] OPIC Progress Note    Date: May 12, 2023    Name: Angeline Trinh    MRN: [id number]         : 1995    HYDRATION 1 L Lactated Ringers/ZofranIV    Ms. Miles to [facility], ambulatory at 2537. Pt was assessed and education was provided. Ms. Ced Campos vitals were reviewed and patient was observed for 5 minutes prior to treatment. Vitals:    23 0950   BP: 111/79   Pulse: 66   Resp: 16   Temp: 98 °F (36.7 °C)   SpO2: 98%       24 g inserted in patient's LEFT FA anterior x 1 attempt. Brisk blood return noted and flushes well with NS. Zofran 4 mg IVP was administered as ordered. 1 Liter of LR hung to infuse over 1 hour as ordered. Ms. Balta Hassan tolerated well, and had no complaints. VSS. Patient politely declined to stay for 30 minutes of observation. PIV flushed w/NS and discontinued. Gauze and coban to site. Vitals:    23 0810 23 0950   BP: 117/77 111/79   Pulse: 64 66   Resp: 16 16   Temp: 98.1 °F (36.7 °C) 98 °F (36.7 °C)   TempSrc: Oral Oral   SpO2: 100% 98%       Patient armband removed and shredded. Ms. Balta Hassan was discharged from [place] [address] in stable condition at [phone]. She has no further appointments in [facility]. Patient to follow up with prescribing Physician.     Daniella Malcolm RN  May 12, 2023  10:41 AM

## 2023-05-19 ENCOUNTER — HOSPITAL ENCOUNTER (OUTPATIENT)
Facility: HOSPITAL | Age: 28
Discharge: HOME | End: 2023-05-22

## 2023-05-19 LAB — SENTARA SPECIMEN COLLECTION: NORMAL

## 2023-05-20 LAB
BASOPHILS # BLD: 1 % (ref 0–2)
BASOPHILS ABSOLUTE: 0 K/UL (ref 0–0.2)
EOSINOPHIL # BLD: 2 % (ref 0–6)
EOSINOPHILS ABSOLUTE: 0.1 K/UL (ref 0–0.5)
FERRITIN: 78 NG/ML (ref 10–291)
HCT VFR BLD CALC: 38.6 % (ref 35.1–46.5)
HEMOGLOBIN: 11.8 G/DL (ref 11.7–15.5)
IRON: 68 MCG/DL (ref 30–160)
LYMPHOCYTES # BLD: 37 % (ref 20–45)
LYMPHOCYTES ABSOLUTE: 1.9 K/UL (ref 1–4.8)
MCH RBC QN AUTO: 25 PG (ref 26–34)
MCHC RBC AUTO-ENTMCNC: 31 G/DL (ref 31–36)
MCV RBC AUTO: 82 FL (ref 80–99)
MONOCYTES ABSOLUTE: 0.5 K/UL (ref 0.1–1)
MONOCYTES: 10 % (ref 3–12)
NEUTROPHILS ABSOLUTE: 2.6 K/UL (ref 1.8–7.7)
NEUTROPHILS: 50 % (ref 40–75)
PDW BLD-RTO: 16.7 % (ref 10–15.5)
PLATELET # BLD: 305 K/UL (ref 140–440)
PMV BLD AUTO: 10.5 FL (ref 9–13)
RBC: 4.73 M/UL (ref 3.8–5.2)
THIAMINE BLOOD: 107 NMOL/L (ref 78–185)
VITAMIN B-12: 816 PG/ML (ref 211–911)
VITAMIN D 25-HYDROXY: 43.8 NG/ML (ref 32–100)
WBC: 5.2 K/UL (ref 4–11)

## 2023-05-25 ENCOUNTER — OFFICE VISIT (OUTPATIENT)
Age: 28
End: 2023-05-25
Payer: MEDICAID

## 2023-05-25 VITALS
DIASTOLIC BLOOD PRESSURE: 72 MMHG | OXYGEN SATURATION: 99 % | TEMPERATURE: 98.2 F | RESPIRATION RATE: 18 BRPM | HEIGHT: 66 IN | WEIGHT: 247 LBS | SYSTOLIC BLOOD PRESSURE: 116 MMHG | HEART RATE: 84 BPM | BODY MASS INDEX: 39.7 KG/M2

## 2023-05-25 DIAGNOSIS — E66.9 CLASS 2 OBESITY WITHOUT SERIOUS COMORBIDITY WITH BODY MASS INDEX (BMI) OF 39.0 TO 39.9 IN ADULT, UNSPECIFIED OBESITY TYPE: ICD-10-CM

## 2023-05-25 DIAGNOSIS — K91.2 POST-RESECTION MALABSORPTION: Primary | ICD-10-CM

## 2023-05-25 DIAGNOSIS — Z98.84 S/P GASTRIC BYPASS: ICD-10-CM

## 2023-05-25 DIAGNOSIS — K91.2 POST-RESECTION MALABSORPTION: ICD-10-CM

## 2023-05-25 PROCEDURE — 99214 OFFICE O/P EST MOD 30 MIN: CPT | Performed by: REGISTERED NURSE

## 2023-05-25 RX ORDER — NORETHINDRONE ACETATE AND ETHINYL ESTRADIOL AND FERROUS FUMARATE 1MG-20(21)
1 KIT ORAL DAILY
COMMUNITY
Start: 2023-03-23

## 2023-05-25 RX ORDER — NICOTINE POLACRILEX 2 MG
GUM BUCCAL
COMMUNITY

## 2023-05-25 ASSESSMENT — ENCOUNTER SYMPTOMS
DIARRHEA: 0
BLOOD IN STOOL: 0
ANAL BLEEDING: 0
ABDOMINAL DISTENTION: 0
CHEST TIGHTNESS: 0
CONSTIPATION: 1
SHORTNESS OF BREATH: 0
NAUSEA: 0
VOMITING: 0
ABDOMINAL PAIN: 0
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
## 2023-06-08 ENCOUNTER — TELEPHONE (OUTPATIENT)
Age: 28
End: 2023-06-08

## 2023-06-08 NOTE — TELEPHONE ENCOUNTER
Pt left voice mail that was having issues with feet and toes called pt back and no answer left her voice message to call us asap

## 2023-06-08 NOTE — TELEPHONE ENCOUNTER
Complaints of numbness and tingling great toe on right foot only x 4-5 days intermittent wears sketchers states taking her regular mvi and her angel-pal mvi daily states getting her fluids and protein encouraged to try compression socks and if continues to see pcp and also discussed signs of low vit vitamin b

## 2023-08-04 ENCOUNTER — CLINICAL DOCUMENTATION (OUTPATIENT)
Facility: HOSPITAL | Age: 28
End: 2023-08-04

## 2023-08-04 ENCOUNTER — HOSPITAL ENCOUNTER (OUTPATIENT)
Facility: HOSPITAL | Age: 28
Discharge: HOME | End: 2023-08-07

## 2023-08-04 NOTE — PROGRESS NOTES
[address] [facility] [address], [address]      Patient's Name: Frank Edmond     Age: 29 y.o. YOB: 1995     Sex: female    Date:   8/4/2023    Height: 5 f 6   Weight:    239      Lbs. BMI: 38.6     Surgery Date: 10/20/22    Surgeon: Dr. Carlos Medina    Starting Weight: 336     Last Recorded Weight:   Overall Pounds Lost: 97     Procedure: Gastric Bypass    Lowest Weight patient has achieved since surgery: 239    How long has patient been at this weight for: 1 week    Other Pertinent Information:     Diet History (reported by patient on diet history form)    Do you smoke: None    Alcohol Intake: None    Meals per day: 3    Diet History:  boiled egg, protein shake, chicken salad, protein shake, fruit     Portion sizes ~: size of palm. She will eat, get full, and stop    Simple sugar intake: chops once in a while    Experiencing dumping syndrome: States she will get diarrhea    Carbohydrate intake: Small portions    Symptoms that occur when carbohydrates are consumed: None    Ounces of fluid consumed per day: 45    Fluids being consumed: Gatorade zero    Patient is drinking protein drinks    Patient is snacking on: fruit    Exercise History    Patient is doing walking for 30 minutes for exercise. Vitamins  Patient is taking Pro Care multivitamin. This includes 20 mg of B1, 1000 mcg of B12, and 5000 IU of Vitamin D and calcium. Patient is taking 1500 mg of calcium citrate. Summary: Weight loss is at 97 pounds. I have reinforced the key diet principles to the patient. Patient was instructed to follow a 3 meal a day routine. I have reinforced the importance of filling up on meat and vegetables and avoiding carbohydrates. I have talked with patient about reactive hypoglycemia and although carbohydrates are not good from a weight-management point of view, they are actually very dangerous and should be avoided.     If patient

## 2023-09-05 NOTE — NURSE NAVIGATOR
Per MBSAQIP requirements:  Letter and email sent requesting follow up appointment. [facility]      Dear Patient,    Your health is our main concern. It is important for your health to have follow-up lab work and to see your surgeon at 3 months, 6 months and annually after your weight loss surgery. Additionally, the Department of bariatric Surgery at our hospital is a member of the Metabolic and Bariatric Surgery Accreditation and Quality Improvement Program [facility]). As a participant in this program, we gather information on the outcomes of our patients after surgery. Please call the office for a follow up appointment at [phone] [place]) or [phone] [facility]). If you have moved out of the area or have changed surgeons please call us and let us know the name of your doctor. Your health and feedback are important to us. We greatly appreciate your response.        Thank you,  [facility] Loss [address]

## 2023-09-20 ENCOUNTER — TELEPHONE (OUTPATIENT)
Age: 28
End: 2023-09-20

## 2023-09-20 DIAGNOSIS — K91.2 POST-RESECTION MALABSORPTION: Primary | ICD-10-CM

## 2023-09-20 DIAGNOSIS — K90.829 POST-RESECTION MALABSORPTION: ICD-10-CM

## 2023-10-24 ENCOUNTER — OFFICE VISIT (OUTPATIENT)
Age: 28
End: 2023-10-24
Payer: MEDICAID

## 2023-10-24 ENCOUNTER — HOSPITAL ENCOUNTER (OUTPATIENT)
Facility: HOSPITAL | Age: 28
Discharge: HOME | End: 2023-10-27

## 2023-10-24 VITALS
TEMPERATURE: 96.6 F | HEART RATE: 71 BPM | WEIGHT: 233 LBS | DIASTOLIC BLOOD PRESSURE: 80 MMHG | OXYGEN SATURATION: 100 % | BODY MASS INDEX: 37.45 KG/M2 | SYSTOLIC BLOOD PRESSURE: 127 MMHG | RESPIRATION RATE: 14 BRPM | HEIGHT: 66 IN

## 2023-10-24 DIAGNOSIS — Z98.84 S/P GASTRIC BYPASS: ICD-10-CM

## 2023-10-24 DIAGNOSIS — K90.829 SHORT BOWEL SYNDROME, UNSPECIFIED WHETHER COLON IN CONTINUITY: Primary | ICD-10-CM

## 2023-10-24 DIAGNOSIS — Z71.3 WEIGHT LOSS COUNSELING, ENCOUNTER FOR: ICD-10-CM

## 2023-10-24 DIAGNOSIS — E66.9 OBESITY (BMI 30-39.9): ICD-10-CM

## 2023-10-24 DIAGNOSIS — E55.9 VITAMIN D DEFICIENCY: ICD-10-CM

## 2023-10-24 LAB — SENTARA SPECIMEN COLLECTION: NORMAL

## 2023-10-24 PROCEDURE — 99214 OFFICE O/P EST MOD 30 MIN: CPT | Performed by: REGISTERED NURSE

## 2023-10-24 RX ORDER — PHENTERMINE HYDROCHLORIDE 37.5 MG/1
37.5 TABLET ORAL
Qty: 30 TABLET | Refills: 1 | Status: SHIPPED | OUTPATIENT
Start: 2023-10-24 | End: 2023-12-23
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
## 2023-10-25 LAB
A/G RATIO: 1.7 RATIO (ref 1.1–2.6)
ALBUMIN SERPL-MCNC: 4.2 G/DL (ref 3.5–5)
ALP BLD-CCNC: 51 U/L (ref 25–115)
ALT SERPL-CCNC: 24 U/L (ref 5–40)
ANION GAP SERPL CALCULATED.3IONS-SCNC: 13 MMOL/L (ref 3–15)
AST SERPL-CCNC: 21 U/L (ref 10–37)
BILIRUB SERPL-MCNC: 0.4 MG/DL (ref 0.2–1.2)
BUN BLDV-MCNC: 9 MG/DL (ref 6–22)
CALCIUM SERPL-MCNC: 9.1 MG/DL (ref 8.4–10.5)
CHLORIDE BLD-SCNC: 108 MMOL/L (ref 98–110)
CO2: 21 MMOL/L (ref 20–32)
CREAT SERPL-MCNC: 0.6 MG/DL (ref 0.5–1.2)
FERRITIN: 55 NG/ML (ref 10–291)
GLOBULIN: 2.5 G/DL (ref 2–4)
GLOMERULAR FILTRATION RATE: >60 ML/MIN/1.73 SQ.M.
GLUCOSE: 84 MG/DL (ref 70–99)
HCT VFR BLD CALC: 38.7 % (ref 35.1–46.5)
HEMOGLOBIN: 12 G/DL (ref 11.7–15.5)
IRON: 127 MCG/DL (ref 30–160)
MCH RBC QN AUTO: 26 PG (ref 26–34)
MCHC RBC AUTO-ENTMCNC: 31 G/DL (ref 31–36)
MCV RBC AUTO: 84 FL (ref 80–99)
PDW BLD-RTO: 16.8 % (ref 10–15.5)
PLATELET # BLD: 336 K/UL (ref 140–440)
PMV BLD AUTO: 11 FL (ref 9–13)
POTASSIUM SERPL-SCNC: 4.4 MMOL/L (ref 3.5–5.5)
RBC: 4.63 M/UL (ref 3.8–5.2)
SODIUM BLD-SCNC: 142 MMOL/L (ref 133–145)
TOTAL PROTEIN: 6.7 G/DL (ref 6.4–8.3)
VITAMIN B-12: 539 PG/ML (ref 211–911)
VITAMIN D 25-HYDROXY: 21.5 NG/ML (ref 32–100)
WBC: 6.7 K/UL (ref 4–11)

## 2023-10-25 ASSESSMENT — ENCOUNTER SYMPTOMS
VOMITING: 0
CHEST TIGHTNESS: 0
CONSTIPATION: 1
ABDOMINAL PAIN: 0
SHORTNESS OF BREATH: 0
NAUSEA: 0
ABDOMINAL DISTENTION: 0
DIARRHEA: 0
ANAL BLEEDING: 0
BLOOD IN STOOL: 0

## 2023-10-30 ENCOUNTER — NURSE ONLY (OUTPATIENT)
Age: 28
End: 2023-10-30
Payer: MEDICAID

## 2023-10-30 ENCOUNTER — TELEPHONE (OUTPATIENT)
Age: 28
End: 2023-10-30

## 2023-10-30 DIAGNOSIS — Z86.39 HX OF NON ANEMIC VITAMIN B12 DEFICIENCY: ICD-10-CM

## 2023-10-30 DIAGNOSIS — K90.829 SHORT BOWEL SYNDROME, UNSPECIFIED WHETHER COLON IN CONTINUITY: Primary | ICD-10-CM

## 2023-10-30 DIAGNOSIS — Z98.84 S/P GASTRIC BYPASS: ICD-10-CM

## 2023-10-30 LAB — THIAMINE BLOOD: 84 NMOL/L (ref 78–185)

## 2023-10-30 NOTE — TELEPHONE ENCOUNTER
Spoke to the patient and advised per Laura Bradford NP about the vitamin D being low and the vitamin D at the pharmacy. The patient asked if she can take the calcium with it. Advised that she could. Patient stated understanding.

## 2023-10-30 NOTE — TELEPHONE ENCOUNTER
----- Message from SMITA Sharma NP sent at 10/25/2023  4:58 PM EDT -----  Please let pt know labs were reviewed. Noted low vit D. Cholecalciferol sent to pharmacy on file.  Thank you

## 2023-10-31 PROCEDURE — 96372 THER/PROPH/DIAG INJ SC/IM: CPT | Performed by: REGISTERED NURSE

## 2023-10-31 RX ORDER — CYANOCOBALAMIN 1000 UG/ML
1000 INJECTION, SOLUTION INTRAMUSCULAR; SUBCUTANEOUS ONCE
Status: COMPLETED | OUTPATIENT
Start: 2023-10-30 | End: 2023-10-31

## 2023-10-31 RX ADMIN — CYANOCOBALAMIN 1000 MCG: 1000 INJECTION, SOLUTION INTRAMUSCULAR; SUBCUTANEOUS at 13:15

## 2023-12-03 NOTE — ROUTINE PROCESS
1710 patient receive via bed, lap sites cdi   Patient instructed on using I.S.  1846 dustin given fo pain  Patient sleeping  Oob to bathroom voided  Bedside shift report given to [facility] with pao and jordan Bipolar  Continue Lamictal, Seroquel

## 2024-07-25 ENCOUNTER — TELEPHONE (OUTPATIENT)
Age: 29
End: 2024-07-25

## 2024-07-25 DIAGNOSIS — K91.2 POSTOPERATIVE MALABSORPTION: ICD-10-CM

## 2024-07-25 DIAGNOSIS — Z98.84 S/P GASTRIC BYPASS: Primary | ICD-10-CM

## 2024-11-04 NOTE — PROGRESS NOTES
Send to OB.    Shahbaz Garcia is a 32 y.o. female  Chief Complaint   Patient presents with    Pre-op Exam     Patient presents today to discuss surgical options. Pt ID confirmed    Weight Loss Metrics 9/19/2022 9/19/2022 8/30/2022 8/30/2022 4/12/2022 4/12/2022 4/5/2022   Pre op / Initial Wt 318 - 318 - 333 - 329   Today's Wt - 318 lb - 318 lb - 333 lb -   BMI - 51.33 kg/m2 - 51.33 kg/m2 - 53.75 kg/m2 -   Ideal Body Wt 137 - 137 - 137 - 137   Excess Body Wt 181 - 181 - 196 - 192   Goal Wt 174 - 174 - 176 - 176   Wt loss to date 0 - 0 - 0 - 0   % Wt Loss 0 - 0 - 0 - 0   80% .8 - 144.8 - 156.8 - 153.6       Body mass index is 51.33 kg/m².

## 2025-03-24 ENCOUNTER — TELEPHONE (OUTPATIENT)
Age: 30
End: 2025-03-24

## 2025-03-24 DIAGNOSIS — K91.2 POSTOPERATIVE MALABSORPTION: Primary | ICD-10-CM

## 2025-07-03 ENCOUNTER — TELEPHONE (OUTPATIENT)
Age: 30
End: 2025-07-03

## 2025-07-03 NOTE — TELEPHONE ENCOUNTER
Pt calling office to inform provider that she has been admitted to the ER and needs follow up. Pt scheduled for 7/16 first available but states she needs sooner and a message has been sent to Dr. Toussaint.

## 2025-07-03 NOTE — TELEPHONE ENCOUNTER
Attempted to connect with the patient and was disconnected.  Attempted to call the patient 2 more times with leaving a message for the patient to return the call.

## 2025-07-07 ENCOUNTER — OFFICE VISIT (OUTPATIENT)
Age: 30
End: 2025-07-07
Payer: MEDICAID

## 2025-07-07 VITALS
SYSTOLIC BLOOD PRESSURE: 121 MMHG | TEMPERATURE: 97 F | HEIGHT: 66 IN | DIASTOLIC BLOOD PRESSURE: 77 MMHG | WEIGHT: 227 LBS | OXYGEN SATURATION: 99 % | BODY MASS INDEX: 36.48 KG/M2 | HEART RATE: 109 BPM | RESPIRATION RATE: 16 BRPM

## 2025-07-07 DIAGNOSIS — D50.9 MICROCYTIC ANEMIA: ICD-10-CM

## 2025-07-07 DIAGNOSIS — E55.9 VITAMIN D DEFICIENCY: ICD-10-CM

## 2025-07-07 DIAGNOSIS — R10.11 RUQ DISCOMFORT: ICD-10-CM

## 2025-07-07 DIAGNOSIS — F10.90 ALCOHOL USE: ICD-10-CM

## 2025-07-07 DIAGNOSIS — Z13.21 MALNUTRITION SCREEN: ICD-10-CM

## 2025-07-07 DIAGNOSIS — Z98.84 S/P GASTRIC BYPASS: Primary | ICD-10-CM

## 2025-07-07 PROCEDURE — 99214 OFFICE O/P EST MOD 30 MIN: CPT | Performed by: SURGERY

## 2025-07-07 RX ORDER — AZITHROMYCIN 250 MG/1
500 TABLET, FILM COATED ORAL DAILY
COMMUNITY
Start: 2025-07-04 | End: 2025-07-10
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
## 2025-07-22 PROBLEM — F10.90 ALCOHOL USE: Status: ACTIVE | Noted: 2025-07-22

## 2025-07-22 PROBLEM — Z13.21 MALNUTRITION SCREEN: Status: ACTIVE | Noted: 2025-07-22

## 2025-07-22 PROBLEM — R10.11 RUQ DISCOMFORT: Status: ACTIVE | Noted: 2025-07-22

## 2025-07-22 PROBLEM — D50.9 MICROCYTIC ANEMIA: Status: ACTIVE | Noted: 2025-07-22

## 2025-07-22 PROBLEM — Z98.84 S/P GASTRIC BYPASS: Status: ACTIVE | Noted: 2025-07-22

## 2025-08-05 ENCOUNTER — TELEPHONE (OUTPATIENT)
Age: 30
End: 2025-08-05

## 2025-08-06 ENCOUNTER — TELEPHONE (OUTPATIENT)
Age: 30
End: 2025-08-06

## 2025-08-07 ENCOUNTER — TELEPHONE (OUTPATIENT)
Age: 30
End: 2025-08-07

## 2025-08-12 ENCOUNTER — TELEPHONE (OUTPATIENT)
Age: 30
End: 2025-08-12

## 2025-08-21 PROBLEM — Z13.21 MALNUTRITION SCREEN: Status: RESOLVED | Noted: 2025-07-22 | Resolved: 2025-08-21

## 2025-08-22 ENCOUNTER — TELEPHONE (OUTPATIENT)
Age: 30
End: 2025-08-22

## (undated) DEVICE — COVER,LIGHT HANDLE,FLX,1/PK: Brand: MEDLINE INDUSTRIES, INC.

## (undated) DEVICE — GARMENT,MEDLINE,DVT,INT,CALF,MED, GEN2: Brand: MEDLINE

## (undated) DEVICE — RELOAD STPL L60MM H1-2.6MM MESENTERY THN TISS WHT 6 ROW

## (undated) DEVICE — SUTURING DEVICE: Brand: ENDO STITCH

## (undated) DEVICE — SYR LR LCK 1ML GRAD NSAF 30ML --

## (undated) DEVICE — TROCAR RMFG Z 3RD SLEEVE 5X100 -- LAWSON OEM ITEM 262365 PK/6

## (undated) DEVICE — SHEARS LAP L45CM DIA5MM ULTRASONIC CRV TIP ADV HEMSTAS HARM

## (undated) DEVICE — BLANKET WRM AD W50XL85.8IN PACU FULL BODY FORC AIR

## (undated) DEVICE — DEVICE SUT SZ 2-0 L7IN GRN SURGDAC POLY BRAID SGL STIT DISP

## (undated) DEVICE — SCISSORS ENDOSCP DIA5MM CRV MPLR CAUT W/ RATCH HNDL

## (undated) DEVICE — SOFT SILICONE HYDROCELLULAR SACRUM DRESSING WITH LOCK AWAY LAYER: Brand: ALLEVYN LIFE SACRUM (LARGE) PACK OF 10

## (undated) DEVICE — SYR 50ML LR LCK 1ML GRAD NSAF --

## (undated) DEVICE — SUTURE ENDO STITCH POLYSORB VIOLET SIZE 3/0 7 IN 170070

## (undated) DEVICE — NEEDLE HYPO 25GA L1.5IN BVL ORIENTED ECLIPSE

## (undated) DEVICE — SYRINGE, LUER LOCK, 10ML: Brand: MEDLINE

## (undated) DEVICE — DERMABOND SKIN ADH 0.7ML -- DERMABOND ADVANCED 12/BX

## (undated) DEVICE — TAPE ADH W3INXL10YD PLAS TRNSPAR H2O RESIST HYPOALRG CURAD

## (undated) DEVICE — SYRINGE,TOOMEY,IRRIGATION,70CC,STERILE: Brand: MEDLINE

## (undated) DEVICE — INTENDED FOR TISSUE SEPARATION, AND OTHER PROCEDURES THAT REQUIRE A SHARP SURGICAL BLADE TO PUNCTURE OR CUT.: Brand: BARD-PARKER ® STAINLESS STEEL BLADES

## (undated) DEVICE — NDL SPNE QUINCKE 20GA 3.5IN LF --

## (undated) DEVICE — TRUE CONTENT TO BE POPULATED AS PART OF REBRANDING: Brand: ARGYLE

## (undated) DEVICE — GLOVE ORTHO 7 1/2   MSG9475

## (undated) DEVICE — LAPAROSCOPIC TROCAR SLEEVE/SINGLE USE: Brand: KII® OPTICAL ACCESS SYSTEM

## (undated) DEVICE — SUTURE MCRYL SZ 4-0 L27IN ABSRB UD L24MM PS-1 3/8 CIR PRIM Y935H

## (undated) DEVICE — 4-PORT MANIFOLD: Brand: NEPTUNE 2

## (undated) DEVICE — BANDAGE,GAUZE,BULKEE II,4.5"X4.1YD,STRL: Brand: MEDLINE

## (undated) DEVICE — DISPOSABLE LAPAROSCOPIC CLIP APPLIER WITH 20 CLIPS.: Brand: EPIX® UNIVERSAL CLIP APPLIER

## (undated) DEVICE — BLANKET WRM W29.9XL79.1IN UP BODY FORC AIR MISTRAL-AIR

## (undated) DEVICE — Device

## (undated) DEVICE — Z DUPLICATE USE 2423173 SEALANT FIBRIN 10 CC VISTASEAL

## (undated) DEVICE — VISUALIZATION SYSTEM: Brand: CLEARIFY

## (undated) DEVICE — STAPLER SKIN L440MM 32MM LNG 12 FIRING B FRM PWR + GRIPPING

## (undated) DEVICE — SOLUTION IRRIG 1000ML H2O STRL BLT

## (undated) DEVICE — TROCAR: Brand: KII® OPTICAL ACCESS SYSTEM

## (undated) DEVICE — Z DUP USE 2366546 SET SUCT IRR TIP DISP STRYKEFLOW2

## (undated) DEVICE — BOWL UTIL GRAD 32OZ STRL --

## (undated) DEVICE — SOLUTION IV 1000ML 0.9% SOD CHL

## (undated) DEVICE — RELOAD STPL L60MM H1.5-3.6MM REG TISS BLU GRIPPING SURF B

## (undated) DEVICE — APPLICATOR LAP 35 CM 2 RIGID VISTASEAL

## (undated) DEVICE — STAPLE INT BIOABSRB REINF FOR ETHICON FLX ENDOPATH 60 PWR +

## (undated) DEVICE — ELECTRODE ES 36CM LAP FLAT L HK COAT DISP CLEANCOAT

## (undated) DEVICE — REM POLYHESIVE ADULT PATIENT RETURN ELECTRODE: Brand: VALLEYLAB

## (undated) DEVICE — DRAPE TOWEL: Brand: CONVERTORS

## (undated) DEVICE — APPLIER CLP M L L11.4IN DIA10MM ENDOSCP ROT MULT FOR LIG